# Patient Record
Sex: FEMALE
[De-identification: names, ages, dates, MRNs, and addresses within clinical notes are randomized per-mention and may not be internally consistent; named-entity substitution may affect disease eponyms.]

---

## 2022-02-24 ENCOUNTER — HOSPITAL ENCOUNTER (OUTPATIENT)
Dept: PHYSICAL THERAPY | Facility: CLINIC | Age: 27
Setting detail: THERAPIES SERIES
Discharge: HOME | End: 2022-02-24
Payer: COMMERCIAL

## 2022-02-24 PROCEDURE — 97110 THERAPEUTIC EXERCISES: CPT

## 2022-02-24 PROCEDURE — 97161 PT EVAL LOW COMPLEX 20 MIN: CPT

## 2022-02-24 NOTE — CONSULTS
José Miguel  [address]  Phone: [phone]  Fax: [phone]      Physical Therapy Lower Extremity Evaluation    Date:  2022  Patient: Adelso Sharif  : 1995  MRN: [id number]  Physician: DIANA Ellison   Insurance: Update  Medical Diagnosis: M22.2X1- Patellofemoral disorders, right knee  Rehab Codes: M25.561  Onset date: 2/10/22      Next 's appt. : --    Subjective:   CC/HPI: Pt reports to PT with R knee pain. Per pt, she reports that she had an overuse injury in her knee back in 2021 after going on a 5k run after taking some time off. She reports that she then took things easy which improved her symptoms. Pt then states that last week at work she re-aggravated it, stating that she had to go down into a ditch where she was slipping and sliding on ice. Pt states that she had a cortisone injection which did help, however she notes that she still has pain in her knee. Pt states that she is able to complete all work tasks, but extended periods in sitting or standing positions can lead to increased pain. Pt denies any numbness/tingling in her knee. Pt states that she has noticed her knee occasionally give out on her after being on her feet for longer periods. Reports occasional popping in her knee. Generally reports no pain with walking.      PMHx: [] Unremarkable [] Diabetes [] HTN  [] Pacemaker   [] MI/Heart Problems [] Cancer [] Arthritis   [] Other:              [x] Refer to full medical chart  In EPIC     Tests: [x] X-Ray: Negative per pt    [] MRI:    [] Other:     Comorbidities:   [] Obesity [] Dialysis  [x] N/A   [] Asthma/COPD [] Dementia [] Other:   [] Stroke [] Sleep apnea [] Other:   [] Vascular disease [] Rheumatic disease [] Other:       Medications:  [x] Refer to full medical record [] None [] Other:  Allergies:       [x] Refer to full medical record [] None [] Other:    ADL/IADL [x] Previously independent with all [x] Currently independent with all Who currently assists the patient with task     [] Previously independent with all except: [] Currently independent with all except:     Bathing  [] Assist [] Assist     Dress/grooming [] Assist [] Assist     Transfer/mobility [] Assist [] Assist     Feeding [] Assist [] Assist     Toileting [] Assist [] Assist     Driving [] Assist [] Assist     Housekeeping [] Assist [] Assist     Grocery shop/meal prep [] Assist [] Assist          Gait Prior level of function Current level of function    [x] Independent  [] Assist [x] Independent  [] Assist   Device: [x] Independent [x] Independent    [] Straight Cane [] Quad cane [] Straight Cane [] Quad cane    [] Standard walker [] Rolling walker   [] 4 wheeled walker [] Standard walker [] Rolling walker   [] 4 wheeled walker    [] Wheelchair [] Wheelchair       Marital Status    Home type Duplex   Stairs from outside    Stairs inside [id number] [facility] status Currently working,   [employer]   Work Activities/duties  Sitting   Recreational Activities Running, strength training, jiu-jitsu       Pain present? Yes   Location R knee   Pain Rating currently 1-2/10   Pain at worse 6/10   Pain at best 0/10   Description of pain Constant, dull, aching   Altered Sensation Denies   What makes it worse Positions, heat   What makes it better Cold   Symptom progression Gotten better   Sleep Sleeping not affected by knee             Objective:    STRENGTH    Left Right   Hip Flex 5/5 5/5   Ext 3+/5 3+/5   ABD 3+/5 3+/5   ADD     Knee Flex 5/5 4/5   Ext 5/5 4+/5   Ankle DF 5/5 5/5   PF 5/5 55/   INV     EVER                ROM  ° A/P    Left Right   Hip Flex     Ext     ER     IR     ABD     ADD     Knee Flex 146 145   Ext 2* from 0 1* hyperextension   Ankle DF     PF     INV     EVER              TESTS (+/-) Left Right Not Tested   Ant.  Drawer   [x]   Post. Drawer   [x]   Lachmans   [x] Valgus Stress   [x]   Varus Stress   [x]   Letys   [x]   Apleys Comp. [x]   Apleys Dist.   [x]   Hip Scouring   [x]   ROSA ELENAs   [x]   Piriformis   [x]   Armidas   [x]   Talor Tilt   [x]   Pat-Fem Saurabh Santos   [x]   Lanie Merannia  -        OBSERVATION No Deficit Deficit Not Tested Comments   Posture       Forward Head [] [] []    Rounded Shoulders [] [] []    Kyphosis [] [] []    Lordosis [] [] []    Lateral Shift [] [] []    Scoliosis [] [] []    Iliac Crest [x] [] []    PSIS [x] [] []    ASIS [] [] []    Genu Valgus [] [] []    Genu Varus [] [] []    Genu Recurvatum [] [] []    Pronation [] [] []    Supination [] [] []    Leg Length Discrp [x] [] []    Slumped Sitting [] [] []    Palpation [] [x] [] Pt reports tenderness along patellar and quad tendon attachment, tenderness along medial patella border   Sensation [x] [] [] No deficits with testing   Edema [] [] [x]    Neurological [] [] [x]    Patellar Mobility [x] [] []    Patellar Orientation [x] [] []    Gait [x] [] [] Analysis:         FUNCTION Normal Difficult Unable   Sitting [] [x] []   Standing [] [x] []   Ambulation [x] [] []   Groom/Dress [x] [] []   Lift/Carry [] [x] []   Stairs [x] [] []   Bending [x] [] []   Squat [] [x] []   Kneel [] [x] []         BALANCE/PROPRIOCEPTION              [] Not tested   Single leg stance                    R                       L                  PAIN   Eyes open                            30 Sec. 30 Sec              . []    Eyes closed                         10 Sec. 30 Sec              . []          FUNCTIONAL TESTS PAIN NO PAIN COMMENTS   Step Test 4 [] []    6 [] []    8 [] []    Squat [x] [] Pt with BW translation to R while completing, excessive anterior translation of knees over toes          Flexibility Normal Left tight Right tight   Hip flexor [] [x] [x]   quad [] [] []   HS [] [x] [x]   piriformis [x] [] []   gastroc [] [x] [x]    [] [] []    [] [] []    [] [] [] Functional Test: LEFS Score: 35% functionally impaired       Assessment:    Patient would benefit from skilled physical therapy services in order to: Progress enedelia hip/core strength, improve enedelia LE flexibility, improve technique with squatting, improve R LE proprioceptive control, and improve tolerance to working out recreationally. Problems:    [x] ? Pain  [] ? ROM  [x] ? Strength  [x] ? Function        Goals  MET NOT MET ON-  GOING  Details   Date Addressed: NA       STG: To be met in 8 treatments           1. ? Pain: Decrease pain levels to 3/10 with ADLs/working out recreationally. []  []  []      2. ? ROM: Increase flexibility in enedelia LEs to improve symmetry and help normalize mechanics with recreational activities and running. []  []  []      3. Pt will self-report better tolerance to all sitting and standing at work to ease work tasks. []  []  []     4. Independent with Home Exercise Programs []  []  []      []  []  []     Date Addressed: NA       LTG: To be met in 16 treatments       1. Improve score on assessment tool LEFS from 35% impairment to less than 20% impairment, demonstrating improved tolerance to working out recreationally. []  []  []     2. Reduce pain levels to 1/10 or less with ADLs/working out recreationally. []  []  []     3. ? Strength: Increase enedelia hip/core strength to 4+/5 grossly to help improve LE stability with running and working out recreationally. []  []  []     4. Pt will self-report ability to return to all running and working out without any pain to ease activity. Patient goals:  \"Be able to run and train w/o pain or issue\"    Rehab Potential:  [x] Good  [] Fair  [] Poor   Suggested Professional Referral:  [x] No  [] Yes:  Barriers to Goal Achievement[de-identified]  [x] No  [] Yes:  Domestic Concerns:  [x] No  [] Yes:    Pt. Education:  [x] Plans/Goals, Risks/Benefits discussed  [x] Home exercise program    Method of Education: [x] Verbal  [x] Demo  [x] Written  Access Code: [email or web address]  URL: [email or web address]  Date: 02/24/2022  Prepared by: [employer]    Exercises  Supine Bridge - 1 x daily - 7 x weekly - 3 sets - 10 reps - 5 seconds hold  Clamshell - 1 x daily - 7 x weekly - 3 sets - 10 reps  Sidelying Hip Abduction - 1 x daily - 7 x weekly - 3 sets - 10 reps  Prone Hip Extension with Bent Knee - 1 x daily - 7 x weekly - 3 sets - 10 reps    Comprehension of Education:  [x] Verbalizes understanding. [x] Demonstrates understanding. [x] Needs Review. [] Demonstrates/verbalizes understanding of HEP/Ed previously given. Treatment Plan:  [x] Therapeutic Exercise    [] Aquatic Therapy   [x] Manual Therapy     [] Electrical Stimulation  [x] Instruction in HEP      [] Lumbar/Cervical Traction  [x] Neuromuscular Re-education [x] Cold/hotpack  [] Iontophoresis: 4 mg/mL  [x] Vasocompression (GameReady)                    Dexamethasone Sodium  [] Gait Training             Phosphate 40-80 mAmin         []  Medication allergies reviewed for use of    Dexamethasone Sodium Phosphate 4mg/ml     with iontophoresis treatments. Pt is not allergic.     Frequency:  2 x/week for 16 visits    Todays Treatment:  Precautions: General  Exercises:  Exercise  R Knee Reps/ Time Weight/ Level Comments   Bike            HS S      SB S      Hip flexor step S      Prone quad S                  Bridges 2x10, 5\"     Clamshells 2x15     SL hip abd 2x12     Prone hip ext                        Other:    Specific Instructions for next treatment: Continue tx per POC    Evaluation Complexity:  History (Personal factors, comorbidities) [] 0 [x] 1-2 [] 3+   Exam (limitations, restrictions) [x] 1-2 [] 3 [] 4+   Clinical presentation (progression) [] Stable [x] Evolving  [] Unstable   Decision Making [x] Low [] Moderate [] High    [x] Low Complexity [] Moderate Complexity [] High Complexity       Treatment Charges: Mins Units   [x] Evaluation       [x]  Low       []  Moderate       []  High 33 1   [] Modalities     [x]  Ther Exercise 14 1   []  Manual Therapy     []  Ther Activities     []  Aquatics     []  Vasocompression     []  Other       TOTAL TREATMENT TIME: 47    Time in: 1400    Time Out: 8700    Electronically signed by: Oswaldo Davis PT        Physician Signature:________________________________Date:__________________  By signing above or cosigning this note, I have reviewed this plan of care and certify a need for medically necessary rehabilitation services.      *PLEASE SIGN ABOVE AND FAX BACK ALL PAGES*

## 2022-02-28 ENCOUNTER — HOSPITAL ENCOUNTER (OUTPATIENT)
Dept: PHYSICAL THERAPY | Facility: CLINIC | Age: 27
Setting detail: THERAPIES SERIES
Discharge: HOME | End: 2022-02-28
Payer: COMMERCIAL

## 2022-02-28 PROCEDURE — 97110 THERAPEUTIC EXERCISES: CPT

## 2022-02-28 PROCEDURE — 97016 VASOPNEUMATIC DEVICE THERAPY: CPT

## 2022-02-28 NOTE — FLOWSHEET NOTE
[x] [address]   Outpatient Rehabilitation &  Therapy  [address] [place] [address]  P: [phone]  F: [phone]     Physical Therapy Daily Treatment Note    Date:  2022  Patient Name:  Chuy Colbert    :  1995  MRN: [id number]  Physician: Sheryle Matters, CFNP                            Insurance: Update  Medical Diagnosis: M22.2X1- Patellofemoral disorders, right knee                       Rehab Codes: M25.561  Onset date: 2/10/22      Visit# / total visits:      Cancels/No Shows: 0    Subjective:    Pain:  [x] Yes  [] No Location: R knee  Pain Rating: (0-10 scale) /10  Pain altered Tx:  [x] No  [] Yes  Action:  Comments: Pt mentioned going down in another ditch over the weekend recreating her knee pain. Objective:  Precautions: General  Exercises:  Exercise  R Knee Reps/ Time Weight/ Level Comments    Bike 10m   Warm up  x              HS/calf S 30\"x3    x   Prone quad S 30\"x3                   Banded Bridges 2x10, 5\" Blueberry    x   Clamshells 20x Blueberry    x   SL hip abd 20x Blueberry    x   Prone hip ext 20x     x   SB Bridge 20x2\"  Legs straight x              SLR 20x     x   LAQ 20x3\"   x   SAQ 20x3\"     x   Other:     Specific Instructions for next treatment: Continue tx per POC        Treatment Charges: Mins Units Time     []  Modalities      [x]  Ther Exercise 40 3 2:00pm-2:40pm   []  Manual Therapy      []  Ther Activities      []  Aquatics      [x]  Vasocompression 15 1 2:42pm-2:57pm   []  Other      Total Treatment time 55 4        Assessment: [x] Progressing toward goals. [] No change. [x] Other:Initiated session with warm up followed with standing stretches. Added in both LAQ and SAQ to focus on quad activation. Added in resistance to table exercises with a blueberry band and focused on mechanics with proper mm activation. Finished session with vaso.      Goals  MET NOT MET ON-  GOING  Details   Date Addressed: NA           STG: To be met in 8 treatments  ?          1. ? Pain: Decrease pain levels to 3/10 with ADLs/working out recreationally. []??  []??  []??      2. ? ROM: Increase flexibility in enedelia LEs to improve symmetry and help normalize mechanics with recreational activities and running. []??  []??  []??      3. Pt will self-report better tolerance to all sitting and standing at work to ease work tasks. []??  []??  []??      4. Independent with Home Exercise Programs []? ?  []??  []??        []? ?  []??  []??      Date Addressed: NA           LTG: To be met in 16 treatments           1. Improve score on assessment tool LEFS from 35% impairment to less than 20% impairment, demonstrating improved tolerance to working out recreationally. []??  []??  []??      2. Reduce pain levels to 1/10 or less with ADLs/working out recreationally. []??  []??  []??      3. ? Strength: Increase enedelia hip/core strength to 4+/5 grossly to help improve LE stability with running and working out recreationally. []??  []??  []??      4. Pt will self-report ability to return to all running and working out without any pain to ease activity.                  Patient goals: \"Be able to run and train w/o pain or issue\"        Pt. Education:  [x] Yes  [] No  [x] Reviewed Prior HEP/Ed  Method of Education: [x] Verbal  [x] Demo  [] Written  Comprehension of Education:  [x] Verbalizes understanding. [x] Demonstrates understanding. [] Needs review. [] Demonstrates/verbalizes HEP/Ed previously given. Plan: [x] Continue with current plan of care towards goals.         Time In:2:00pm            Time Out: 2:57pm     Electronically signed by:  Meera Parker PTA

## 2022-03-03 ENCOUNTER — HOSPITAL ENCOUNTER (OUTPATIENT)
Dept: PHYSICAL THERAPY | Facility: CLINIC | Age: 27
Setting detail: THERAPIES SERIES
Discharge: HOME | End: 2022-03-03
Payer: COMMERCIAL

## 2022-03-03 PROCEDURE — 97016 VASOPNEUMATIC DEVICE THERAPY: CPT

## 2022-03-03 PROCEDURE — 97110 THERAPEUTIC EXERCISES: CPT

## 2022-03-03 NOTE — FLOWSHEET NOTE
[x] [address]   Outpatient Rehabilitation &  Therapy  [address] [facility]  P: [phone]  F: [phone]     Physical Therapy Daily Treatment Note    Date:  3/3/2022  Patient Name:  Krissy Colmenares    :  1995  MRN: [id number]  Physician: DIANA Fitch                            Insurance: Update  Medical Diagnosis: M22.2X1- Patellofemoral disorders, right knee                       Rehab Codes: M25.561  Onset date: 2/10/22      Visit# / total visits: 3/16     Cancels/No Shows: 0    Subjective:    Pain:  [x] Yes  [] No Location: R knee  Pain Rating: (0-10 scale) 2/10  Pain altered Tx:  [x] No  [] Yes  Action:  Comments: Pt stated that her knee is feeling better today. Objective:  Precautions: General  Exercises:  Exercise  R Knee Reps/ Time Weight/ Level Comments    Bike 10m Seat 2 Warm up  x              HS/calf S 30\"x3    x   Prone quad S 30\"x3     x              Banded Bridges 2x10, 5\" Blueberry    x   Clamshells 20x Blueberry    x   SL hip abd 20x Blueberry    x   Prone hip ext 20x   Bent knee x   SB Bridge 20x2\"  Legs straight x   SB Bridge with SLR 20x     x           LAQ 20x3\" 2#  x   4-way hip  10x Blueberry   x   Other:     Specific Instructions for next treatment: Continue tx per POC        Treatment Charges: Mins Units Time     []  Modalities      [x]  Ther Exercise 40 3 2:01pm- 2:41pm   []  Manual Therapy      []  Ther Activities      []  Aquatics      [x]  Vasocompression 15 1 2:42pm- 2:57pm   []  Other      Total Treatment time 55 4        Assessment: [x] Progressing toward goals. [] No change. [x] Other:  Continued with warm up and stretches to help increase blood flow and loosen up tight structures. Focused core/hip stability with the addition of bridges with SLR on SB to place pt on unstable surface while maintaining neutral position of hips. Standing 4 way hips added in to challenge pt SLS with added in force.  Pt did not have any increased symptoms just fatigued at end. Goals  MET NOT MET ON-  GOING  Details   Date Addressed: NA           STG: To be met in 8 treatments  ?          1. ? Pain: Decrease pain levels to 3/10 with ADLs/working out recreationally. []??  []??  []??      2. ? ROM: Increase flexibility in enedelia LEs to improve symmetry and help normalize mechanics with recreational activities and running. []??  []??  []??      3. Pt will self-report better tolerance to all sitting and standing at work to ease work tasks. []??  []??  []??      4. Independent with Home Exercise Programs []? ?  []??  []??        []? ?  []??  []??      Date Addressed: NA           LTG: To be met in 16 treatments           1. Improve score on assessment tool LEFS from 35% impairment to less than 20% impairment, demonstrating improved tolerance to working out recreationally. []??  []??  []??      2. Reduce pain levels to 1/10 or less with ADLs/working out recreationally. []??  []??  []??      3. ? Strength: Increase enedelia hip/core strength to 4+/5 grossly to help improve LE stability with running and working out recreationally. []??  []??  []??      4. Pt will self-report ability to return to all running and working out without any pain to ease activity.                  Patient goals: \"Be able to run and train w/o pain or issue\"        Pt. Education:  [x] Yes  [] No  [x] Reviewed Prior HEP/Ed  Method of Education: [x] Verbal  [x] Demo  [] Written  Comprehension of Education:  [x] Verbalizes understanding. [x] Demonstrates understanding. [] Needs review. [] Demonstrates/verbalizes HEP/Ed previously given. Plan: [x] Continue with current plan of care towards goals.         Time In:2:01pm            Time Out:  2:57pm    Electronically signed by:  Eyal Baxter PTA

## 2022-03-07 ENCOUNTER — HOSPITAL ENCOUNTER (OUTPATIENT)
Dept: PHYSICAL THERAPY | Facility: CLINIC | Age: 27
Setting detail: THERAPIES SERIES
Discharge: HOME | End: 2022-03-07
Payer: COMMERCIAL

## 2022-03-07 PROCEDURE — 97110 THERAPEUTIC EXERCISES: CPT

## 2022-03-07 NOTE — FLOWSHEET NOTE
Abduction Kicks - 1 x daily - 4 x weekly - 3 sets - 10 reps  Standing Hip Extension Kicks - 1 x daily - 4 x weekly - 3 sets - 10 reps  Gastroc Stretch on Wall - 1 x daily - 7 x weekly - 3 sets - 30 hold  Standing Hamstring Stretch with Step - 1 x daily - 7 x weekly - 3 sets - 30 hold  Standing Quadriceps Stretch - 1 x daily - 7 x weekly - 3 sets - 30 hold      Treatment Charges: Mins Units Time     []  Modalities      [x]  Ther Exercise 59 4 3:00pm- 3:59pm   []  Manual Therapy      []  Ther Activities      []  Aquatics      []  Vasocompression       []  Other      Total Treatment time 59 4 3:00pm-3:59pm       Assessment: [x] Progressing toward goals. [] No change. [x] Other:   Increased strengthening with active quad targeting exercises. Pt able to perform all seated and table exercises with min verbal cueing for slow and controlled movements d/t pt loosing form with fatigue. Pt unable to hold split stance squat isometric for extended period of time d/t weakness/fatigue along with R knee pain when in retro position. Pt was given written HEP along with blueberry band for home. Goals  MET NOT MET ON-  GOING  Details   Date Addressed: NA           STG: To be met in 8 treatments  ?          1. ? Pain: Decrease pain levels to 3/10 with ADLs/working out recreationally. []??  []??  []??      2. ? ROM: Increase flexibility in enedelia LEs to improve symmetry and help normalize mechanics with recreational activities and running. []??  []??  []??      3. Pt will self-report better tolerance to all sitting and standing at work to ease work tasks. []??  []??  []??      4. Independent with Home Exercise Programs []? ?  []??  []??        []? ?  []??  []??      Date Addressed: NA           LTG: To be met in 16 treatments           1. Improve score on assessment tool LEFS from 35% impairment to less than 20% impairment, demonstrating improved tolerance to working out recreationally. []??  []??  []??      2.  Reduce pain levels to 1/10 or less with ADLs/working out recreationally. []??  []??  []??      3. ? Strength: Increase enedelia hip/core strength to 4+/5 grossly to help improve LE stability with running and working out recreationally. []??  []??  []??      4. Pt will self-report ability to return to all running and working out without any pain to ease activity.                  Patient goals: \"Be able to run and train w/o pain or issue\"        Pt. Education:  [x] Yes  [] No  [x] Reviewed Prior HEP/Ed  Method of Education: [x] Verbal  [x] Demo  [] Written  Comprehension of Education:  [x] Verbalizes understanding. [x] Demonstrates understanding. [] Needs review. [] Demonstrates/verbalizes HEP/Ed previously given. Plan: [x] Continue with current plan of care towards goals.         Time In:3:00pm            Time Out:  3:59pm    Electronically signed by:  Elisa Ott PTA

## 2022-03-08 ENCOUNTER — APPOINTMENT (OUTPATIENT)
Dept: PHYSICAL THERAPY | Facility: CLINIC | Age: 27
End: 2022-03-08
Payer: COMMERCIAL

## 2022-03-10 ENCOUNTER — HOSPITAL ENCOUNTER (OUTPATIENT)
Dept: PHYSICAL THERAPY | Facility: CLINIC | Age: 27
Setting detail: THERAPIES SERIES
Discharge: HOME | End: 2022-03-10
Payer: COMMERCIAL

## 2022-03-10 PROCEDURE — 97110 THERAPEUTIC EXERCISES: CPT

## 2022-03-10 NOTE — FLOWSHEET NOTE
[x] [address]    Outpatient Rehabilitation &  Therapy  [address] [place] [address]  P: [phone]  F: [phone]     Physical Therapy Daily Treatment Note    Date:  3/10/2022  Patient Name:  Yuli Jose    :  1995  MRN: [id number]  Physician: DIANA Sanchez                            Insurance: Update  Medical Diagnosis: M22.2X1- Patellofemoral disorders, right knee                       Rehab Codes: M25.561  Onset date: 2/10/22      Visit# / total visits:      Cancels/No Shows: 0    Subjective:    Pain:  [x] Yes  [] No Location: R knee  Pain Rating: (0-10 scale) 2/10  Pain altered Tx:  [x] No  [] Yes  Action:  Comments: Pt stated that she had a little pain this morning with twisting motions but nothing too bad. Objective:  Precautions: General  Exercises:  Exercise  R Knee Reps/ Time Weight/ Level Comments    Bike 10m Seat 2 Warm up  x              HS/calf S 30\"x3    x   Prone quad S 30\"x3     x              Banded Bridges 2x10, 5\" Blueberry    HEP   Clamshells 20x Blueberry    HEP   SL hip abd 20x Blueberry    HEP   SB Bridge 20x2\" Blue SB Legs straight x   SB Bridge with HS curl 20x     x          Heel taps 2x10 6\" Lateral  x   Eccentric lowering 20x Bilateral  24\" x   Power strides 20x Bilateral  12\" x   Box squats 20x Blueberry  12\" with blue foam pad x   Box squat hovers 20x3\" Blueberry  12\" with blue foam pad x   SL dead lift touches 2x10 Bilateral  6\" x                  LAQ 20x5\" 4#  x   SAQ 20x5\" 4#  x   SLR 20x 4#  x   4-way hip  15x Blueberry   HEP                 Other:     Specific Instructions for next treatment: Continue tx per POC     Access Code: [email or web address]  URL: [email or web address]  Date: 03/10/2022  Prepared by: Mariano Moser    Exercises  Gastroc Stretch on Wall - 1 x daily - 7 x weekly - 3 sets - 30 hold  Standing Hamstring Stretch with Step - 1 x daily - 7 x weekly - 3 sets - 30 hold  Standing Quadriceps Stretch - 1 x daily - 7 x weekly - 3 sets - 30 hold  Clam with Resistance - 1 x daily - 4 x weekly - 3 sets - 10 reps  Supine Bridge with Resistance Band - 1 x daily - 4 x weekly - 3 sets - 10 reps  Sidelying Hip Abduction with Resistance at Ankle - 1 x daily - 4 x weekly - 3 sets - 10 reps  Hip Extension with Resistance Loop - 1 x daily - 4 x weekly - 3 sets - 10 reps  Hip Abduction with Resistance Loop - 1 x daily - 4 x weekly - 3 sets - 10 reps  Standing Hip Flexion with Resistance Loop - 1 x daily - 4 x weekly - 3 sets - 10 reps      Treatment Charges: Mins Units Time     []  Modalities      [x]  Ther Exercise 60 4 3:00pm- 4:00pm   []  Manual Therapy      []  Ther Activities      []  Aquatics      []  Vasocompression       []  Other      Total Treatment time 60 4 3:00pm-4:00pm       Assessment: [x] Progressing toward goals. [] No change. [x] Other:   Progressed all standing exercises with focus on single leg strength and stability exercises with focus on isolation of quad with eccentric movements. Able to progress load when focusing on posterior chain with all movements concentrated on slow and controlled movements. Attempted low squat hover but pt did experience increased symptoms in knee so discontinued exercise. Finished on quad strengthening on mat table. Goals  MET NOT MET ON-  GOING  Details   Date Addressed: NA           STG: To be met in 8 treatments  ?          1. ? Pain: Decrease pain levels to 3/10 with ADLs/working out recreationally. []??  []??  []??      2. ? ROM: Increase flexibility in enedelia LEs to improve symmetry and help normalize mechanics with recreational activities and running. []??  []??  []??      3. Pt will self-report better tolerance to all sitting and standing at work to ease work tasks. []??  []??  []??      4. Independent with Home Exercise Programs []? ?  []??  []??        []? ?  []??  []??      Date Addressed: NA           LTG: To be met in 16 treatments           1.  Improve score on assessment tool LEFS from 35% impairment to less than 20% impairment, demonstrating improved tolerance to working out recreationally. []??  []??  []??      2. Reduce pain levels to 1/10 or less with ADLs/working out recreationally. []??  []??  []??      3. ? Strength: Increase enedelia hip/core strength to 4+/5 grossly to help improve LE stability with running and working out recreationally. []??  []??  []??      4. Pt will self-report ability to return to all running and working out without any pain to ease activity.                  Patient goals: \"Be able to run and train w/o pain or issue\"        Pt. Education:  [x] Yes  [] No  [x] Reviewed Prior HEP/Ed  Method of Education: [x] Verbal  [x] Demo  [] Written  Comprehension of Education:  [x] Verbalizes understanding. [x] Demonstrates understanding. [] Needs review. [] Demonstrates/verbalizes HEP/Ed previously given. Plan: [x] Continue with current plan of care towards goals.         Time In:3:00pm            Time Out:  4:00pm    Electronically signed by:  Ella Beck PTA

## 2022-03-14 ENCOUNTER — HOSPITAL ENCOUNTER (OUTPATIENT)
Dept: PHYSICAL THERAPY | Facility: CLINIC | Age: 27
Setting detail: THERAPIES SERIES
Discharge: HOME | End: 2022-03-14
Payer: COMMERCIAL

## 2022-03-14 PROCEDURE — 97110 THERAPEUTIC EXERCISES: CPT

## 2022-03-14 NOTE — FLOWSHEET NOTE
[x] [address]   Outpatient Rehabilitation &  Therapy  [address] [facility]  P: [phone]  F: [phone]     Physical Therapy Daily Treatment Note    Date:  3/14/2022  Patient Name:  Latisha Cabello    :  1995  MRN: [id number]  Physician: DIANA Bloom                            Insurance: Update  Medical Diagnosis: M22.2X1- Patellofemoral disorders, right knee                       Rehab Codes: M25.561  Onset date: 2/10/22      Visit# / total visits:      Cancels/No Shows: 0    Subjective:    Pain:  [x] Yes  [] No Location: R knee  Pain Rating: (0-10 scale) /10  Pain altered Tx:  [x] No  [] Yes  Action:  Comments: Pt mentioned a little pain at bottom of knee cap but she thinks that it is from working all weekend. Objective:  Precautions: General  Exercises:  Exercise  R Knee Reps/ Time Weight/ Level Comments    Bike 10m Seat 2 Warm up  x              HS/calf S 30\"x3    x   Prone quad S 30\"x3     x              SB Bridge with HS curl 20x Blue SB   x   Monster walks 2L Green tube  Only able to complete 1 lap d/t LB stiffness x   Heel taps 2x10 6\" Lateral  x   Eccentric lowering 20x Bilateral  24\" x   Power strides 20x Bilateral  12\" x   Box squats 20x Blueberry  12\" with blue foam pad x   Box squat hovers 20x3\" Blueberry  12\" with blue foam pad x   SL dead lift touches 2x10 Bilateral  6\" x                  LAQ 20x5\" 4#  x   SAQ 20x5\" 4#  x   SLR 20x 4#  x          Other:     Specific Instructions for next treatment: Continue tx per POC     Access Code: [email or web address]  URL: [email or web address]  Date: 03/10/2022  Prepared by: To Quezada    Exercises  Gastroc Stretch on Wall - 1 x daily - 7 x weekly - 3 sets - 30 hold  Standing Hamstring Stretch with Step - 1 x daily - 7 x weekly - 3 sets - 30 hold  Standing Quadriceps Stretch - 1 x daily - 7 x weekly - 3 sets - 30 hold  Clam with Resistance - 1 x daily - 4 x weekly - 3 sets - 10 reps  Supine Bridge with Resistance Band - 1 x daily - 4 x weekly - 3 sets - 10 reps  Sidelying Hip Abduction with Resistance at Ankle - 1 x daily - 4 x weekly - 3 sets - 10 reps  Hip Extension with Resistance Loop - 1 x daily - 4 x weekly - 3 sets - 10 reps  Hip Abduction with Resistance Loop - 1 x daily - 4 x weekly - 3 sets - 10 reps  Standing Hip Flexion with Resistance Loop - 1 x daily - 4 x weekly - 3 sets - 10 reps      Treatment Charges: Mins Units Time     []  Modalities      [x]  Ther Exercise 58 4 3:02pm- 4:00pm   []  Manual Therapy      []  Ther Activities      []  Aquatics      []  Vasocompression       []  Other      Total Treatment time 58 4 3:02pm-4:00pm       Assessment: [x] Progressing toward goals. [] No change. [x] Other:   No further progressions today. Focused on controlled movements and mechanics. Pt has min/mod valgus collapse on R knee with eccentric lowering but able correct with cueing. Attempted lateral monster walks with green tube but was having symptoms with LB so discontinued. Goals  MET NOT MET ON-  GOING  Details   Date Addressed: NA           STG: To be met in 8 treatments  ?          1. ? Pain: Decrease pain levels to 3/10 with ADLs/working out recreationally. []??  []??  []??      2. ? ROM: Increase flexibility in enedelia LEs to improve symmetry and help normalize mechanics with recreational activities and running. []??  []??  []??      3. Pt will self-report better tolerance to all sitting and standing at work to ease work tasks. []??  []??  []??      4. Independent with Home Exercise Programs []? ?  []??  []??        []? ?  []??  []??      Date Addressed: NA           LTG: To be met in 16 treatments           1. Improve score on assessment tool LEFS from 35% impairment to less than 20% impairment, demonstrating improved tolerance to working out recreationally. []??  []??  []??      2. Reduce pain levels to 1/10 or less with ADLs/working out recreationally. []??  []??  []??      3. ? Strength: Increase enedelia hip/core strength to 4+/5 grossly to help improve LE stability with running and working out recreationally. []??  []??  []??      4. Pt will self-report ability to return to all running and working out without any pain to ease activity.                  Patient goals: \"Be able to run and train w/o pain or issue\"        Pt. Education:  [x] Yes  [] No  [x] Reviewed Prior HEP/Ed  Method of Education: [x] Verbal  [x] Demo  [] Written  Comprehension of Education:  [x] Verbalizes understanding. [x] Demonstrates understanding. [] Needs review. [] Demonstrates/verbalizes HEP/Ed previously given. Plan: [x] Continue with current plan of care towards goals.         Time In:3:02pm            Time Out:  4:00pm    Electronically signed by:  Dany Koroma PTA

## 2022-03-16 ENCOUNTER — HOSPITAL ENCOUNTER (OUTPATIENT)
Dept: PHYSICAL THERAPY | Facility: CLINIC | Age: 27
Setting detail: THERAPIES SERIES
Discharge: HOME | End: 2022-03-16
Payer: COMMERCIAL

## 2022-03-16 PROCEDURE — 97110 THERAPEUTIC EXERCISES: CPT

## 2022-03-16 PROCEDURE — 97140 MANUAL THERAPY 1/> REGIONS: CPT

## 2022-03-16 NOTE — FLOWSHEET NOTE
[x] [address]   Outpatient Rehabilitation &  Therapy  [address] [place] [address]  P: [phone]  F: [phone]     Physical Therapy Daily Treatment Note    Date:  3/16/2022  Patient Name:  Jessy Uribe    :  1995  MRN: [id number]  Physician: DIANA Moore                            Insurance: Update  Medical Diagnosis: M22.2X1- Patellofemoral disorders, right knee                       Rehab Codes: M25.561  Onset date: 2/10/22      Visit# / total visits:      Cancels/No Shows: 0/0    Subjective:  Pt reporting to PT with 1/10 pain, stating that she is pretty sore from her previous treatment still. Pain:  [x] Yes  [] No  Location: R knee   Pain Rating: (0-10 scale) 1/10  Pain altered Tx:  [x] No  [] Yes  Action:  Comments:        Objective:  Precautions: General  Exercises:  Exercise  R Knee Reps/ Time Weight/ Level Comments    Bike 10m Seat 2 Warm up  x              HS/calf S 30\"x3    x   Prone quad S 30\"x3   Half foam roller under thigh x              SB Bridge with HS curl 20x Blue SB      Monster walks 3L Green tube   x   Heel taps 2x10 6\" Lateral  x   Eccentric lowering 20x Bilateral  24\"    Power strides 20x Bilateral  12\"    Box squats 20x Blueberry  12\" with blue foam pad    Box squat hovers 20x3\" Blueberry  12\" with blue foam pad    SL dead lift touches 2x10 Bilateral  6\"    Heel supported squats 3x10 6# db  x   Split squat TKE 2x10 Red band  x           LAQ 2x12, 5\" 4#  x   SAQ 20x5\" 4#     SLR 2x12 4#  x          Other: DI R piriformis, glut med     Specific Instructions for next treatment: Continue tx per POC        Treatment Charges: Mins Units Time     []  Modalities      [x]  Ther Exercise 50 7 [id number]   [x]  Manual Therapy 7 2 [id number]   []  Ther Activities      []  Aquatics      []  Vasocompression       []  Other      Total Treatment time 57 4        Assessment: [x] Progressing toward goals.  Completed tx per exercise log above without any complaints. Progressed pt today with increased reps with all ankle weight exercises, increased reps with monster walks, as well as with addition of split squat TKE and heel supported squats to help with progressing quad strength. Pt reports better tolerance to monster walks today, denying any pain with completion, allowing for performance of all laps. Pt is also reporting fatigue with split squat TKE, however denied any pain with completion. Ended with manual per log above with tenderness throughout, but good releases noted. [] No change. [x] Other:   No further progressions today. Focused on controlled movements and mechanics. Pt has min/mod valgus collapse on R knee with eccentric lowering but able correct with cueing. Attempted lateral monster walks with green tube but was having symptoms with LB so discontinued. Goals  MET NOT MET ON-  GOING  Details   Date Addressed: NA           STG: To be met in 8 treatments  ?          1. ? Pain: Decrease pain levels to 3/10 with ADLs/working out recreationally. []??  []??  []??      2. ? ROM: Increase flexibility in enedelia LEs to improve symmetry and help normalize mechanics with recreational activities and running. []??  []??  []??      3. Pt will self-report better tolerance to all sitting and standing at work to ease work tasks. []??  []??  []??      4. Independent with Home Exercise Programs []? ?  []??  []??        []? ?  []??  []??      Date Addressed: NA           LTG: To be met in 16 treatments           1. Improve score on assessment tool LEFS from 35% impairment to less than 20% impairment, demonstrating improved tolerance to working out recreationally. []??  []??  []??      2. Reduce pain levels to 1/10 or less with ADLs/working out recreationally. []??  []??  []??      3. ? Strength: Increase enedelia hip/core strength to 4+/5 grossly to help improve LE stability with running and working out recreationally. []??  []??  []??      4.  Pt will self-report ability to return to all running and working out without any pain to ease activity.                  Patient goals: \"Be able to run and train w/o pain or issue\"        Pt. Education:  [x] Yes  [] No  [x] Reviewed Prior HEP/Ed  Method of Education: [x] Verbal  [x] Demo  [] Written  Comprehension of Education:  [x] Verbalizes understanding. [x] Demonstrates understanding. [x] Needs review. [] Demonstrates/verbalizes HEP/Ed previously given. Plan: [x] Continue with current plan of care towards goals. Time In: 0800           Time Out: 0900     Electronically signed by:   Chau Arevalo, PT

## 2022-03-22 ENCOUNTER — HOSPITAL ENCOUNTER (OUTPATIENT)
Dept: PHYSICAL THERAPY | Facility: CLINIC | Age: 27
Setting detail: THERAPIES SERIES
Discharge: HOME | End: 2022-03-22
Payer: COMMERCIAL

## 2022-03-22 PROCEDURE — 97110 THERAPEUTIC EXERCISES: CPT

## 2022-03-22 NOTE — FLOWSHEET NOTE
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 Pain:  [x] Yes  [] No  Location: R knee   Pain Rating: (0-10 scale) 0/10  Pain altered Tx:  [x] No  [] Yes  Action:  Comments:        Objective:   Precautions: General  Exercises:  Exercise  R Knee Reps/ Time Weight/ Level Comments    Bike 10m Seat 2 Warm up  x              HS/calf S 30\"x3    x   Prone quad S 30\"x3   Half foam roller under thigh x              SB Bridge with HS curl 20x Blue SB   x   Monster walks 3L Green tube      Heel taps 2x10 8\" Lateral  x   Eccentric lowering 20x Bilateral  24\"    Power strides 20x Bilateral  12\" x   Box squats 20x Blueberry  12\" with blue foam pad, blue band around knees x   Box squat hovers 20x3\" Blueberry  12\" with blue foam pad x   SL dead lift touches 2x10 9# KB 6\" x   Heel supported squats 3x10 7# db  x   Split squat TKE 2x10 Red band             LAQ 2x12, 5\" 4#  x   SAQ 20x5\" 4#     SLR 2x12 4#  x          Other: DI R piriformis, glut med     Specific Instructions for next treatment: Continue tx per POC        Treatment Charges: Mins Units Time     []  Modalities      [x]  Ther Exercise 55 4 2:00 pm-2:55 pm   [x]  Manual Therapy      []  Ther Activities      []  Aquatics      []  Vasocompression       []  Other      Total Treatment time 55 4        Assessment: [x] Progressing toward goals.  Completed tx per exercise log above without any complaints. Progressed weight for SL deadlifts and heel elevated squats, inc box height for eccentric heel taps. Pt tolerated progression of exercises with good mechanics. Pt demonstrates fatigue with box squats, but no c/o pain or other discomfort. [] No change. [x] Other:   No further progressions today. Focused on controlled movements and mechanics. Pt has min/mod valgus collapse on R knee with eccentric lowering but able correct with cueing. Attempted lateral monster walks with green tube but was having symptoms with LB so discontinued. Goals  MET NOT MET ON-  GOING  Details   Date Addressed: NA           STG: To be met in 8 treatments  ?          1. ? Pain: Decrease pain levels to 3/10 with ADLs/working out recreationally. []??  []??  []??      2. ? ROM: Increase flexibility in enedelia LEs to improve symmetry and help normalize mechanics with recreational activities and running. []??  []??  []??      3. Pt will self-report better tolerance to all sitting and standing at work to ease work tasks. []??  []??  []??      4. Independent with Home Exercise Programs []? ?  []??  []??        []? ?  []??  []??      Date Addressed: NA           LTG: To be met in 16 treatments           1. Improve score on assessment tool LEFS from 35% impairment to less than 20% impairment, demonstrating improved tolerance to working out recreationally. []??  []??  []??      2. Reduce pain levels to 1/10 or less with ADLs/working out recreationally. []??  []??  []??      3. ? Strength: Increase enedelia hip/core strength to 4+/5 grossly to help improve LE stability with running and working out recreationally. []??  []??  []??      4. Pt will self-report ability to return to all running and working out without any pain to ease activity.                  Patient goals: \"Be able to run and train w/o pain or issue\"        Pt.  Education:  [x] Yes  [] No  [x] Reviewed Prior HEP/Ed  Method of Education: [x] Verbal [x] Demo  [] Written  Comprehension of Education:  [x] Verbalizes understanding. [x] Demonstrates understanding. [x] Needs review. [] Demonstrates/verbalizes HEP/Ed previously given. Plan: [x] Continue with current plan of care towards goals. Time In: 2:00 pm           Time Out: 2:55 pm    Electronically signed by:  CHEPE Marin The above documentation completed by Yuval Martinez, Student Physical Therapist Assistant, was reviewed and accepted by supervising Clinical Instructor Miley Rankin PTA.

## 2022-03-23 NOTE — FLOWSHEET NOTE
[] [facility] [address]  [address]    [phone]  F: [phone]   [] [address]  [facility] [address]   [address]  P: [phone]  F: [phone]  [x] [address] [facility] &  Therapy  [address]  P: [phone]  F: [phone] [] [address]  P: [phone]  F: [phone]  [] [address]  [address] [facility] [address]   [address]   [place]: [phone]  F: [phone]   [] [address]  [place]: [phone]   F: [phone]     Physical Therapy Cancel/No Show note    Date: 3/23/2022  Patient: Latisha Cabello  : 1995  MRN: [id number]    Cancels/No Shows to date:     For today's appointment patient:    [x]  Cancelled    [] Rescheduled appointment    [] No-show     Reason given by patient:    []  Patient ill    []  Conflicting appointment    [] No transportation      [] Conflict with work    [x] No reason given    [] Weather related    [] COVID-19    [] Other:      Comments:        [x] Next appointment was confirmed    Electronically signed by: Cassy Crisostomo, PT

## 2022-03-24 ENCOUNTER — HOSPITAL ENCOUNTER (OUTPATIENT)
Dept: PHYSICAL THERAPY | Facility: CLINIC | Age: 27
Setting detail: THERAPIES SERIES
Discharge: HOME | End: 2022-03-24
Payer: COMMERCIAL

## 2022-03-29 ENCOUNTER — HOSPITAL ENCOUNTER (OUTPATIENT)
Dept: PHYSICAL THERAPY | Facility: CLINIC | Age: 27
Setting detail: THERAPIES SERIES
Discharge: HOME | End: 2022-03-29
Payer: COMMERCIAL

## 2022-03-29 PROCEDURE — 97110 THERAPEUTIC EXERCISES: CPT

## 2022-03-29 NOTE — FLOWSHEET NOTE
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 Pain:  [x] Yes  [] No  Location: R knee   Pain Rating: (0-10 scale) 0/10  Pain altered Tx:  [x] No  [] Yes  Action:  Comments:        Objective:   Precautions: General  Exercises:  Exercise  R Knee Reps/ Time Weight/ Level Comments    Bike 10m Seat 2 Warm up  x              HS/calf S 30\"x3    x   Prone quad S 30\"x3   Half foam roller under thigh               SB Bridge with HS curl 20x Blue SB             Monster walks 3L Green tube      Heel taps 2x10 8\" Lateral  x   Eccentric lowering 20x Bilateral  24\" x   Power strides 20x Bilateral  12\" x   Box squats 20x Blueberry  12\" with blue foam pad, blue band around knees x   Box squat hovers 20x3\" Blueberry  12\" with blue foam pad x   SL dead lift touches 2x10 9# KB 6\" x   Heel supported squats 3x10 12# bar  x   Split squat TKE 2x10 Red band  x           LAQ 2x12, 5\" 4#     SAQ 20x5\" 4#     SLR 2x12 4#            Other:      Specific Instructions for next treatment: Continue tx per POC        Treatment Charges: Mins Units Time     []  Modalities      [x]  Ther Exercise 45 3 3:15 pm-4:00 pm   [x]  Manual Therapy      []  Ther Activities      []  Aquatics      []  Vasocompression       []  Other      Total Treatment time 45 3        Assessment: [x] Progressing toward goals.  Completed tx per exercise log above without any complaints. Progressed weight for heel elevated squats. Pt tolerated progression of exercises with good mechanics. Pt demonstrates inc control of eccentric movements and dec valgus collapse during SL activities. Pt experiences fatigue with exercise, but no c/o pain. [] No change. [x] Other:   No further progressions today. Focused on controlled movements and mechanics. Attempted lateral monster walks with green tube but was having symptoms with LB so discontinued. Goals  MET NOT MET ON-  GOING  Details   Date Addressed: NA           STG: To be met in 8 treatments  ?          1. ? Pain: Decrease pain levels to 3/10 with ADLs/working out recreationally. []??  []??  []??      2. ? ROM: Increase flexibility in enedelia LEs to improve symmetry and help normalize mechanics with recreational activities and running. []??  []??  []??      3. Pt will self-report better tolerance to all sitting and standing at work to ease work tasks. []??  []??  []??      4. Independent with Home Exercise Programs []? ?  []??  []??        []? ?  []??  []??      Date Addressed: NA           LTG: To be met in 16 treatments           1. Improve score on assessment tool LEFS from 35% impairment to less than 20% impairment, demonstrating improved tolerance to working out recreationally. []??  []??  []??      2. Reduce pain levels to 1/10 or less with ADLs/working out recreationally. []??  []??  []??      3. ? Strength: Increase enedelia hip/core strength to 4+/5 grossly to help improve LE stability with running and working out recreationally. []??  []??  []??      4. Pt will self-report ability to return to all running and working out without any pain to ease activity.                  Patient goals: \"Be able to run and train w/o pain or issue\"        Pt. Education:  [x] Yes  [] No  [x] Reviewed Prior HEP/Ed  Method of Education: [x] Verbal  [x] Demo  [] Written  Comprehension of Education:  [x] Verbalizes understanding.   [x] Demonstrates understanding. [x] Needs review. [] Demonstrates/verbalizes HEP/Ed previously given. Plan: [x] Continue with current plan of care towards goals. Time In: 3:15 pm           Time Out: 4:00 pm    Electronically signed by:  CHEPE Segundo The above documentation completed by Héctor Lewis, Student Physical Therapist Assistant, was reviewed and accepted by supervising Clinical Instructor Stephania Ogden PTA.

## 2022-04-06 ENCOUNTER — HOSPITAL ENCOUNTER (OUTPATIENT)
Dept: PHYSICAL THERAPY | Facility: CLINIC | Age: 27
Setting detail: THERAPIES SERIES
Discharge: HOME | End: 2022-04-06
Payer: COMMERCIAL

## 2022-04-06 PROCEDURE — 97110 THERAPEUTIC EXERCISES: CPT

## 2022-04-06 NOTE — FLOWSHEET NOTE
[x] [address]   Outpatient Rehabilitation &  Therapy  [address] [facility]  P: [phone]  F: [phone]     Physical Therapy Daily Treatment Note    Date:  2022  Patient Name:  Rhianna Camarillo    :  1995  MRN: [id number]  Physician: DIANA Daniel                            Insurance: Update  Medical Diagnosis: M22.2X1- Patellofemoral disorders, right knee                       Rehab Codes: M25.561  Onset date: 2/10/22      Visit# / total visits: 10/16     Cancels/No Shows: 1/0    Subjective:  Despite having to run and walk down ditches yesterday, pt states that she not experiencing any pain. She did experience a short period of pain early this morning.   Pain:  [] Yes  [x] No  Location: R knee   Pain Rating: (0-10 scale) 0/10  Pain altered Tx:  [x] No  [] Yes  Action:  Comments:        Objective:   Precautions: General  Exercises:  Exercise  R Knee Reps/ Time Weight/ Level Comments    Bike 10m Seat 2 Warm up  x              HS/calf S 30\"x3    x   Prone quad S 30\"x3   Half foam roller under thigh               SB Bridge with HS curl 20x Blue SB             Monster walks 3L Green tube      Heel taps 3x10 8\" Lateral  x   Eccentric lowering 30x Bilateral  24\" x   Power strides 30x Bilateral  12\" x   Box squats 30x Blueberry  12\" with blue foam pad, blue band around knees x   Box squat hovers 30x3\" Blueberry  12\" with blue foam pad x   SL dead lift touches 3x10 13# KB 6\" x   Heel supported squats 3x10 12# bar     Split squat TKE 3x10 Red band     BOSU lunges 2x10   x           LAQ 2x12, 5\" 4#     SAQ 20x5\" 4#     SLR 2x12 4#            Other:      Specific Instructions for next treatment: Continue tx per POC        Treatment Charges: Mins Units Time     []  Modalities      [x]  Ther Exercise 55 4 12:05 pm- 1:00 pm   [x]  Manual Therapy      []  Ther Activities      []  Aquatics      []  Vasocompression       []  Other      Total Treatment time 55 4 Assessment: [x] Progressing toward goals. Completed tx per exercise log above without any complaints. Initiated BOSU lunges to strengthen and challenge the stability of the R knee. Inc repetitions for all standing exercises to further improve strength and endurance. Pt continues to demo inc control of movements as well as dec valgus collapse. Pt tolerated exercise progressions well, needs occasional rest breaks due to fatigue but no c/o pain. [] No change. [x] Other:   No further progressions today. Focused on controlled movements and mechanics. Attempted lateral monster walks with green tube but was having symptoms with LB so discontinued. Goals  MET NOT MET ON-  GOING  Details   Date Addressed: NA           STG: To be met in 8 treatments            1. ? Pain: Decrease pain levels to 3/10 with ADLs/working out recreationally. []  []  []      2. ? ROM: Increase flexibility in enedelia LEs to improve symmetry and help normalize mechanics with recreational activities and running. []  []  []      3. Pt will self-report better tolerance to all sitting and standing at work to ease work tasks. []  []  []      4. Independent with Home Exercise Programs []  []  []        []  []  []      Date Addressed: NA           LTG: To be met in 16 treatments           1. Improve score on assessment tool LEFS from 35% impairment to less than 20% impairment, demonstrating improved tolerance to working out recreationally. []  []  []      2. Reduce pain levels to 1/10 or less with ADLs/working out recreationally. []  []  []      3. ? Strength: Increase enedelia hip/core strength to 4+/5 grossly to help improve LE stability with running and working out recreationally. []  []  []      4. Pt will self-report ability to return to all running and working out without any pain to ease activity. Patient goals: \"Be able to run and train w/o pain or issue\"        Pt.  Education:  [x] Yes  [] No  [x] Reviewed Prior HEP/Ed  Method of Education: [x] Verbal  [x] Demo  [] Written  Comprehension of Education:  [x] Verbalizes understanding. [x] Demonstrates understanding. [x] Needs review. [] Demonstrates/verbalizes HEP/Ed previously given. Plan: [x] Continue with current plan of care towards goals. Time In: 12:05 pm           Time Out: 1:00 pm    Electronically signed by:  Reyes Pagan, SPTA The above documentation completed by Reyes Pagan, Student Physical Therapist Assistant, was reviewed and accepted by supervising Clinical Instructor Janette Wlaton PTA.

## 2022-04-12 ENCOUNTER — HOSPITAL ENCOUNTER (OUTPATIENT)
Dept: PHYSICAL THERAPY | Facility: CLINIC | Age: 27
Setting detail: THERAPIES SERIES
Discharge: HOME | End: 2022-04-12
Payer: COMMERCIAL

## 2022-04-12 PROCEDURE — 97110 THERAPEUTIC EXERCISES: CPT

## 2022-04-12 NOTE — FLOWSHEET NOTE
Assessment: [x] Progressing toward goals. Began with spin bike followed by stretching and strengthening as noted above. Initiated PMT double and SL PMT squat/HR, pt tolerated additional exercises well, requiring verbal cueing for foot placement. Also began use of rebounder and progressed to forward heel taps with good pt tolerance. Attempted to add BOSU split squats, but pt began experiencing inc sxs. Pt also experienced inc sxs when performing eccentric lowering, both exercises discontinued. Pt requires cueing during heel elevated squats to slow eccentric and correct valgus collapse. Pt continues to demo inc control of movements as well as dec valgus collapse. Pt tolerated exercise progressions well, needs occasional rest breaks due to fatigue. [] No change. [x] Other:   No further progressions today. Focused on controlled movements and mechanics. Attempted lateral monster walks with green tube but was having symptoms with LB so discontinued. Goals  MET NOT MET ON-  GOING  Details   Date Addressed: NA           STG: To be met in 8 treatments            1. ? Pain: Decrease pain levels to 3/10 with ADLs/working out recreationally. []  []  []      2. ? ROM: Increase flexibility in enedelia LEs to improve symmetry and help normalize mechanics with recreational activities and running. []  []  []      3. Pt will self-report better tolerance to all sitting and standing at work to ease work tasks. []  []  []      4. Independent with Home Exercise Programs []  []  []        []  []  []      Date Addressed: NA           LTG: To be met in 16 treatments           1. Improve score on assessment tool LEFS from 35% impairment to less than 20% impairment, demonstrating improved tolerance to working out recreationally. []  []  []      2. Reduce pain levels to 1/10 or less with ADLs/working out recreationally. []  []  []      3. ? Strength:  Increase enedelia hip/core strength to 4+/5 grossly to help improve LE stability with running and working out recreationally. []  []  []      4. Pt will self-report ability to return to all running and working out without any pain to ease activity. Patient goals: \"Be able to run and train w/o pain or issue\"        Pt. Education:  [x] Yes  [] No  [x] Reviewed Prior HEP/Ed  Method of Education: [x] Verbal  [x] Demo  [] Written  Comprehension of Education:  [x] Verbalizes understanding. [x] Demonstrates understanding. [x] Needs review. [] Demonstrates/verbalizes HEP/Ed previously given. Plan: [x] Continue with current plan of care towards goals. Time In: 3:00 pm           Time Out: 3:54 pm    Electronically signed by:  CHEPE Johnson The above documentation completed by Queen Debora, Student Physical Therapist Assistant, was reviewed and accepted by supervising Clinical Instructor Su Smiley PTA.

## 2022-04-21 ENCOUNTER — HOSPITAL ENCOUNTER (OUTPATIENT)
Dept: PHYSICAL THERAPY | Facility: CLINIC | Age: 27
Setting detail: THERAPIES SERIES
Discharge: HOME | End: 2022-04-21
Payer: COMMERCIAL

## 2022-04-21 PROCEDURE — 97110 THERAPEUTIC EXERCISES: CPT
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
## 2022-04-26 ENCOUNTER — HOSPITAL ENCOUNTER (OUTPATIENT)
Dept: PHYSICAL THERAPY | Facility: CLINIC | Age: 27
Setting detail: THERAPIES SERIES
Discharge: HOME | End: 2022-04-26
Payer: COMMERCIAL

## 2022-04-26 PROCEDURE — 97110 THERAPEUTIC EXERCISES: CPT

## 2022-04-26 NOTE — FLOWSHEET NOTE
[x] [address]   Outpatient Rehabilitation &  Therapy  [address]  P: [phone]  F: [phone]     Physical Therapy Daily Treatment Note    Date:  2022  Patient Name:  Concepcion Mcmanus    :  1995  MRN: [id number]  Physician: DIANA Carroll                            Insurance: Update  Medical Diagnosis: M22.2X1- Patellofemoral disorders, right knee                       Rehab Codes: M25.561  Onset date: 2/10/22      Visit# / total visits:      Cancels/No Shows: 1  [employer]: 30 visits/calendar year    Subjective: Pt explains she is having discomfort that is constant around the patella. Also explains that Hypervolt applied last session helped relieve tightness.   Pain:  [x] Yes  [] No  Location: R knee   Pain Rating: (0-10 scale) 1/10  Pain altered Tx:  [x] No  [] Yes  Action:  Comments:        Objective:   Precautions: General  Exercises:  Exercise  R Knee Reps/ Time Weight/ Level Comments    Bike 10m Seat 2 Warm up  x              HS/calf S 30\"x3    x   Prone quad S 30\"x3   Half foam roller under thigh -              SB Bridge with HS curl 20x Blue SB   -          Heel taps 3x10 6\" Front x   Eccentric lowering 30x Bilateral  24\" x   Power strides 30x Bilateral  18\" x   PMT squat/HR 30x   x   SL PMT squat/HR 30x   x   Box squat hovers 30x3\" Purple 12\" with blue foam pad x   SL dead lift touches 3x10 13# KB  x   Heel supported squats 3x10 12# bar  x   BOSU split squat 2x10   x   Split squat TKE 3x10 Red band     Rebounder x30 3.5# Forward only           Other:      Specific Instructions for next treatment: Continue tx per POC        Treatment Charges: Mins Units Time     []  Modalities      [x]  Ther Exercise 57 4 1:58 pm - 2:55 pm   [x]  Manual Therapy 5 nc 2:55 pm - 3:00 pm   []  Ther Activities      []  Aquatics      []  Vasocompression       []  Other      Total Treatment time 62 4 1:58 pm - 3:00 pm       Assessment: [x] Progressing toward goals. Began with spin bike followed by stretching and strengthening as noted above. Pt was able to tolerate both BOSU lunges and PMT squats/HR this tx with no inc in pain/discomfort. However, pt did demo inc fatigue, requiring frequent rest breaks. Verbal cueing provided during hover squats to control concentric and hold, and prevent leaning forward at the hips. Due to inc tightness in R LE, used on roller used on entire R thigh, most tightness noted in the quad and lat thigh. [] No change. [x] Other:   No further progressions today. Focused on controlled movements and mechanics. Goals  MET NOT MET ON-  GOING  Details   Date Addressed: NA           STG: To be met in 8 treatments            1. ? Pain: Decrease pain levels to 3/10 with ADLs/working out recreationally. []  []  []      2. ? ROM: Increase flexibility in enedelia LEs to improve symmetry and help normalize mechanics with recreational activities and running. []  []  []      3. Pt will self-report better tolerance to all sitting and standing at work to ease work tasks. []  []  []      4. Independent with Home Exercise Programs []  []  []        []  []  []      Date Addressed: NA           LTG: To be met in 16 treatments           1. Improve score on assessment tool LEFS from 35% impairment to less than 20% impairment, demonstrating improved tolerance to working out recreationally. []  []  []      2. Reduce pain levels to 1/10 or less with ADLs/working out recreationally. []  []  []      3. ? Strength: Increase enedelia hip/core strength to 4+/5 grossly to help improve LE stability with running and working out recreationally. []  []  []      4. Pt will self-report ability to return to all running and working out without any pain to ease activity. Patient goals: \"Be able to run and train w/o pain or issue\"        Pt.  Education:  [x] Yes  [] No  [x] Reviewed Prior HEP/Ed  Method of Education: [x] Verbal  [x] Demo  [] Written  Comprehension of Education:  [x] Verbalizes understanding. [x] Demonstrates understanding. [x] Needs review. [] Demonstrates/verbalizes HEP/Ed previously given. Plan: [x] Continue with current plan of care towards goals. Time In: 1:58 pm           Time Out: 3:00 pm    Electronically signed by:  CHEPE Encarnacion The above documentation completed by Chaz Conley, Student Physical Therapist Assistant, was reviewed and accepted by supervising Clinical Instructor Cinthia House PTA.

## 2022-05-03 ENCOUNTER — HOSPITAL ENCOUNTER (OUTPATIENT)
Dept: PHYSICAL THERAPY | Facility: CLINIC | Age: 27
Setting detail: THERAPIES SERIES
Discharge: HOME | End: 2022-05-03
Payer: COMMERCIAL

## 2022-05-03 PROCEDURE — 97110 THERAPEUTIC EXERCISES: CPT

## 2022-05-03 NOTE — FLOWSHEET NOTE
[x] [address]    Outpatient Rehabilitation &  Therapy  [address] [place] [address]  P: [phone]  F: [phone]     Physical Therapy Daily Treatment Note    Date:  5/3/2022  Patient Name:  Deep Sadler    :  1995  MRN: [id number]  Physician: DIANA Snow                            Insurance: [employer] ( Visits Approved, [employer])  Medical Diagnosis: M22.2X1- Patellofemoral disorders, right knee                       Rehab Codes: M25.561  Onset date: 2/10/22      Visit# / total visits:      Cancels/No Shows:   [employer]: 30 visits/calendar year    Subjective: Pt explains that her pain levels continue to be low, but she feels that the pain has become more consistent. States that the knee has been feeling fine during and after working longer shifts. 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   Pain:  [x] Yes  [] No  Location: R knee   Pain Rating: (0-10 scale) 1/10  Pain altered Tx:  [x] No  [] Yes  Action:  Comments:        Objective:   Precautions: General  Exercises:  Exercise  R Knee Reps/ Time Weight/ Level Comments    Bike 10m Seat 2 Warm up  x              HS/calf S 30\"x3    x              Heel taps 3x10 6\" Front x   Eccentric lowering 30x Bilateral  24\" x   Power strides 30x Bilateral  18\" x   PMT squat/HR 30x      SL PMT squat/HR 30x      Box squat hovers 30x3\" Purple 12\" with blue foam pad x   SL dead lift touches 3x10 13# KB  x   Heel supported squats 3x10 12# bar  x   BOSU split squat 2x10      Split squat TKE 3x10 Red band     Rebounder x30 3.5# Forward only x          Other:      Specific Instructions for next treatment: Continue tx per POC        Treatment Charges: Mins Units Time     []  Modalities      [x]  Ther Exercise 50 3 2:00 pm - 2:50 pm   [x]  Manual Therapy 5 nc 2:50 pm - 2:55 pm   []  Ther Activities      []  Aquatics      []  Vasocompression       []  Other Total Treatment time 55 3 2:00 pm - 2:55 pm       Assessment: [x] Progressing toward goals. Began with spin bike followed by stretching and strengthening as noted above. Due to c/o consistent pain and pt's qualifiers tonight, held additional progressions this session. Also held PMT squats as well as BOSU lunges as these have caused discomfort in prev txs. No c/o inc sxs with any other activities this session. Completed tx with  to R quad in order to reduce any tension. [] No change. [x] Other:   No further progressions today. Focused on controlled movements and mechanics. Goals  MET NOT MET ON-  GOING  Details   Date Addressed: NA           STG: To be met in 8 treatments            1. ? Pain: Decrease pain levels to 3/10 with ADLs/working out recreationally. []  []  []      2. ? ROM: Increase flexibility in enedelia LEs to improve symmetry and help normalize mechanics with recreational activities and running. []  []  []      3. Pt will self-report better tolerance to all sitting and standing at work to ease work tasks. []  []  []      4. Independent with Home Exercise Programs []  []  []        []  []  []      Date Addressed: NA           LTG: To be met in 16 treatments           1. Improve score on assessment tool LEFS from 35% impairment to less than 20% impairment, demonstrating improved tolerance to working out recreationally. []  []  []      2. Reduce pain levels to 1/10 or less with ADLs/working out recreationally. []  []  []      3. ? Strength: Increase enedelia hip/core strength to 4+/5 grossly to help improve LE stability with running and working out recreationally. []  []  []      4. Pt will self-report ability to return to all running and working out without any pain to ease activity. Patient goals: \"Be able to run and train w/o pain or issue\"        Pt.  Education:  [x] Yes  [] No  [x] Reviewed Prior HEP/Ed  Method of Education: [x] Verbal  [x] Demo  [] Written  Comprehension of Education:  [x] Verbalizes understanding. [x] Demonstrates understanding. [x] Needs review. [] Demonstrates/verbalizes HEP/Ed previously given. Plan: [x] Continue with current plan of care towards goals. Time In: 2:00 pm           Time Out: 2:55 pm    Electronically signed by:  Evangelina Ahumada, SPTA The above documentation completed by Evangelina Ahumada, Student Physical Therapist Assistant, was reviewed and accepted by supervising Clinical Instructor Jeaneen Kussmaul, PTA.

## 2022-05-10 ENCOUNTER — HOSPITAL ENCOUNTER (OUTPATIENT)
Dept: PHYSICAL THERAPY | Facility: CLINIC | Age: 27
Setting detail: THERAPIES SERIES
End: 2022-05-10
Payer: COMMERCIAL

## 2022-05-13 ENCOUNTER — HOSPITAL ENCOUNTER (OUTPATIENT)
Dept: PHYSICAL THERAPY | Facility: CLINIC | Age: 27
Setting detail: THERAPIES SERIES
Discharge: HOME | End: 2022-05-13
Payer: COMMERCIAL

## 2022-05-13 PROCEDURE — 97110 THERAPEUTIC EXERCISES: CPT

## 2022-05-13 PROCEDURE — 97140 MANUAL THERAPY 1/> REGIONS: CPT

## 2022-05-13 NOTE — PROGRESS NOTES
[x] [address]    Outpatient Rehabilitation &  Therapy  [address] [place] [address]  P: [phone]  F: [phone]     Physical Therapy Daily Treatment Note/ Progress Report    Date:  2022  Patient Name:  Shawn Briscoe    :  1995  MRN: [id number]  Physician: DIANA Chris                            Insurance: Michelet Howell ( Visits Approved, [employer])  Medical Diagnosis: M22.2X1- Patellofemoral disorders, right knee                       Rehab Codes: M25.561  Onset date: 2/10/22      Visit# / total visits: 15/16     Cancels/No Shows: 1  [employer]: 30 visits/calendar year    Subjective: Pt reports to PT stating that she feels that her knee has been getting worse ever since starting to  the exercise program in therapy, noting that she has now been having more consistent dull pain, as well as having more episodes of the knee giving out on her.   Pain:  [x] Yes  [] No  Location: R knee   Pain Rating: (0-10 scale) 1-2/10  Pain altered Tx:  [x] No  [] Yes  Action:  Comments:        Objective:   Precautions: General  Exercises:  Exercise  R Knee Reps/ Time Weight/ Level Comments    Bike 10m Seat 2 Warm up                HS/calf S 30\"x3                  Heel taps 3x10 6\" Front    Eccentric lowering 30x Bilateral  24\"    Power strides 30x Bilateral  18\"    PMT squat/HR 30x      SL PMT squat/HR 30x      Box squat hovers 30x3\" Purple 12\" with blue foam pad    SL dead lift touches 3x10 13# KB     Heel supported squats 3x10 12# bar     BOSU split squat 2x10      Split squat TKE 3x10 Red band     Rebounder x30 3.5# Forward only                  Wall sits 3x30\"   x   Hip thrusters 2x10   x   Clamshells 3x10, 2\" Blue Eccentric x   SL Hip abd 3x10, 2\" Blue Eccentric x                 Other: [employer]volt R quad grossly     (+) Patellar Grind test  (-) Thesally  (-) Anterior/Posterior Drawer    - Still with tightness in R quad grossly      Specific Instructions for next treatment: Pt to be placed on hold until she has a f/u with referring physician        Treatment Charges: Mins Units   []  Modalities     [x]  Ther Exercise 35 2   [x]  Manual Therapy 7 1   []  Ther Activities     []  Aquatics     []  Vasocompression      []  Other     Total Treatment time 42 3       Assessment: [x] Progressing toward goals. Since starting therapy, pt reports that she has seen progress in her R knee to date. Per pt, she reports that she has seen improved strength grossly, better tolerance to sitting and standing at work with less pain/discomfort, as well as better overall stability in her R knee. However, pt currently reporting that she is still having pain in her knee, as well as now having more feeling of the leg wanting to give out on her while walking. Pt still presenting with hip weakness more so on R LE compared to the L which could be leading to some of her knee problems that would benefit from additional therapy to keep working on addressing. At this time, pt would like to continue with therapy, so plan to do so for additional 12 visits at 2x/week, however recommended that pt have f/u with referring physician first to determine if any other interventions elisabeth be recommended at this time, with pt in agreement with plan.    [] No change. [x] Other: Completed tx per log above with fair tolerance. Switched up program today to focus more on hip strengthening d/t pt having increased knee pain, while also still demonstrating increased weakness on R LE compared to L. Pt with minimal pain with all exercises completed during treatment, but does report some general aching and discomfort with WB exercises, although notes that it is tolerable to continue with exercises. No increase in pain reported following treatment.      Goals  MET NOT MET ON-  GOING  Details   Date Addressed: 5/13/22           STG: To be met in 8 treatments            1. ? Pain: Decrease pain levels to 3/10 with ADLs/working out recreationally. []  []  [x]      2. ? ROM: Increase flexibility in enedelia LEs to improve symmetry and help normalize mechanics with recreational activities and running. []  []  [x]  Pt still with tightness in R quad    3. Pt will self-report better tolerance to all sitting and standing at work to ease work tasks. [x]  []  []      4. Independent with Home Exercise Programs [x]  []  []        []  []  []      Date Addressed: 5/13/22           LTG: To be met in 16 treatments           1. Improve score on assessment tool LEFS from 35% impairment to less than 20% impairment, demonstrating improved tolerance to working out recreationally. []  []  [x]  Pt scored 22% impaired    2. Reduce pain levels to 1/10 or less with ADLs/working out recreationally. []  []  [x]      3. ? Strength: Increase enedelia hip/core strength to 4+/5 grossly to help improve LE stability with running and working out recreationally. []  []  [x]  3+/5 R hip abd, ext    4. Pt will self-report ability to return to all running and working out without any pain to ease activity. x           Patient goals: \"Be able to run and train w/o pain or issue\"        Pt. Education:  [x] Yes  [] No  [x] Reviewed Prior HEP/Ed  Method of Education: [x] Verbal  [x] Demo  [] Written  Comprehension of Education:  [x] Verbalizes understanding. [x] Demonstrates understanding. [x] Needs review. [] Demonstrates/verbalizes HEP/Ed previously given. Plan: [x] Continue with current plan of care towards goals. X   Pt would benefit from additional 12 visits at 2x/week once current POC is done. Time In: 1100           Time Out: 3242    Electronically signed by:   Megha Rose PT         Physician Signature:________________________________Date:__________________  By signing above or cosigning this note, I have reviewed this plan of care and certify a need for medically necessary rehabilitation services.      *PLEASE SIGN ABOVE AND FAX BACK ALL PAGES*

## 2022-10-18 ENCOUNTER — HOSPITAL ENCOUNTER (OUTPATIENT)
Dept: PHYSICAL THERAPY | Facility: CLINIC | Age: 27
Setting detail: THERAPIES SERIES
Discharge: HOME | End: 2022-10-18
Payer: COMMERCIAL

## 2022-10-18 PROCEDURE — 97110 THERAPEUTIC EXERCISES: CPT

## 2022-10-18 PROCEDURE — 97161 PT EVAL LOW COMPLEX 20 MIN: CPT

## 2022-10-18 PROCEDURE — 97016 VASOPNEUMATIC DEVICE THERAPY: CPT

## 2022-10-18 NOTE — CONSULTS
[] [facility]) [facility] &  Therapy  [address]  [phone]  F: [phone] [] [address]  [facility]   [address]  P: [phone]  F: [phone] [x] [address] &  Therapy  [address]  P: [phone]  F: [phone] [] [address]  P: [phone]  F: [phone] [] [address]  [facility]   [address]   [place]: [phone]  F: [phone]      Physical Therapy Lower Extremity Evaluation    Date:  10/18/2022  Patient: Filomena Barroso  : 1995  MRN: [id number]  Physician: Jamie Augustine MD Insurance: [facility] 30 visits  Medical Diagnosis: Right knee pain  Rehab Codes: M25.561, M25.661, R26.2  Onset date: 10/13/22  Next 's appt.: 10/21/22    Subjective:   CC: Pt c/o R knee pain/stiffness, a lot of discomfort med knee currently notes she was not able to take meds this morning. Feeling better overall w/ ice and meds. HPI: (onset date) Pt report she had a few minor injuries to R knee, had prev PT and injection w/o much relief resulting in surg for plica resection /35.     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,   Akila ramirez deputy   Job Status []  Normal duty   [] Light duty   [x] Off due to condition    []  Retired   [] Not employed   [] Disability  [] Other:  [x]  Return to work: Work activities/duties Running, strength training, jiu-jitsu       ADL/IADL Previous level of function Current level of function Who currently assists the patient with task   Bathing  [x] Independent  [] Assist [x] Independent  [] Assist    Dress/grooming [x] Independent  [] Assist [x] Independent  [] Assist    Transfer/mobility [x] Independent  [] Assist [x] Independent  [] Assist    Feeding [x] Independent  [] Assist [x] Independent  [] Assist    Toileting [x] Independent  [] Assist [x] Independent  [] Assist    Driving [x] Independent  [] Assist [] Independent  [x] Assist fiance   Housekeeping [x] Independent  [] Assist [x] Independent  [] Assist    Grocery shop/meal prep [x] Independent  [] Assist [x] Independent  [] Assist      Gait Prior level of function Current level of function    [x] Independent  [] Assist [x] Independent  [] Assist   Device: [x] Independent [x] Independent     Pain:  [x] Yes  [] No Location: R medial knee Pain Rating: (0-10 scale) 2/10  Pain altered Tx:  [] Yes  [x] No  Action:    Symptoms:  [x] Improving [] Worsening [] Same  Sleep: [x] OK    [] Disturbed    Objective:    ROM  ° A/P STRENGTH TESTS (+/-) Left Right Not Tested    Left Right Left Right Ant.  Drawer   []   Hip Flex    4+ Post. Drawer   []   Ext     Lachmans   []   ER     Valgus Stress   []   IR     Varus Stress   []   ABD    4+ Letys   []   ADD     Apleys Comp.   []   Knee Flex 140 78  5 Apleys Dist.   []   Ext 0 0  4+ Hip Scouring   []   Ankle DF    5 ROSA ELENAs   []   PF     Piriformis   []   INV     Armidas   []   EVER     Talor Tilt   []        Pat-Fem Grind   []   R +quad lag    OBSERVATION No Deficit Deficit Not Tested Comments   Posture       Palpation [] [x] [] 4 Steri strips intact, min dried blood, no redness or warmth   Sensation [] [] []    Edema [] [x] [] Min R knee   Neurological [] [] []    Patellar Mobility [] [] []    Patellar Orientation [] [] []    Gait [] [x] [] Analysis: no AD, antalgic, decr stance R LE         FUNCTION Normal Difficult Unable   Sitting [] [] []   Standing [] [x] []   Ambulation [] [x] []   Groom/Dress [] [] []   Lift/Carry [] [] []   Stairs [] [x] []   Bending [] [x] []   Squat [] [x] []   Kneel [] [x] []       Functional Test: LEFS Score: 53% functionally impaired     Assessment:  Patient would benefit from skilled physical therapy services in order to: address R knee ROM, strength and functional deficits to allow for return to normal activity and job duties    Problems:    [x] ? Pain: R knee  [x] ? ROM: R knee  [x] ? Strength: R hip, knee, core stability  [x] ? Function: difficulty w/ squatting, stairs, running and recreational activity  [] Other:       STG: (to be met in 12 treatments)  ? Pain: Pt to report no pain w/ ADL's  ? ROM: R knee AROM 0-130  ? Strength: R LE grossly 5/5, demo good core stability  ? Function:Improve LEFS from 53% impairment to less than 20% impairment, demonstrating improved tolerance to working out, recreational activity and job dutues  Patient to be independent with home exercise program as demonstrated by performance with correct form without cues. Patient goals: heal and run, exercise again    Rehab Potential:  [x] Good  [] Fair  [] Poor   Suggested Professional Referral:  [x] No  [] Yes:  Barriers to Goal Achievement:  [x] No  [] Yes:  Domestic Concerns:  [x] No  [] Yes:    Pt. Education:  [x] Plans/Goals, Risks/Benefits discussed  [x] Home exercise program    Method of Education: [x] Verbal  [x] Demo  [x] Written-HEP  Comprehension of Education:  [x] Verbalizes understanding. [] Demonstrates understanding. [] Needs Review. [] Demonstrates/verbalizes understanding of HEP/Ed previously given.     Treatment Plan:  [x] Therapeutic Exercise   34734  [] Iontophoresis: 4 mg/mL Dexamethasone Sodium Phosphate  mAmin  74372   [x] Therapeutic Activity  87568 [x] Vasopneumatic cold with compression  85976    [x] Gait Training   82781 [] Ultrasound   [id number]   [x] Neuromuscular Re-education  26615 [] Electrical Stimulation Unattended  30927   [x] Manual Therapy  28025 [] Electrical Stimulation Attended  88268   [x] Instruction in HEP  [] Lumbar/Cervical Traction  [id number]   [] Aquatic Therapy   65899 [x] Cold/hotpack    [] Massage   34515      [] Dry Needling, 1 or 2 muscles  40038   [] Biofeedback, first 15 minutes   22191  [] Biofeedback, additional 15 minutes   49785 [] Dry Needling, 3 or more muscles  79936     Frequency:  2-3 x/week for 12 visits    Todays Treatment:  Modalities: vaso med pressure R knee post Rx  Precautions:  Exercises:  Exercise Reps/ Time Weight/ Level Comments   bike      HS, quad, calf stretch            QS, HS 10x2 10\"    Heel slide 10x2     SAQ 10x2     SLR 10x2 AAROM 1st set    LAQ 10x2     Other: Instructed in therex per log and issued HO for HEP.  Encouraged ice prn    Specific Instructions for next treatment: progress ROM and strength as leonor      Evaluation Complexity:  History (Personal factors, comorbidities) [] 0 [x] 1-2 [] 3+   Exam (limitations, restrictions) [x] 1-2 [] 3 [] 4+   Clinical presentation (progression) [x] Stable [] Evolving  [] Unstable   Decision Making [x] Low [] Moderate [] High    [x] Low Complexity [] Moderate Complexity [] High Complexity       Treatment Charges: Mins Units   [] Evaluation       []  Low       []  Moderate       []  High 15 1   []  Modalities     [x]  Ther Exercise 20 1   []  Manual Therapy     []  Ther Activities     []  Aquatics     [x]  Vasocompression 15 1   []  Other       TOTAL TREATMENT TIME: 50    Time in:11:05 am   Time Out:12:00 pm    Electronically signed by: Abraham Jefferson PT        Physician Signature:________________________________Date:__________________  By signing above or cosigning this note, I have reviewed this plan of care and certify a need for medically necessary rehabilitation services.      *PLEASE SIGN ABOVE AND FAX BACK ALL PAGES*

## 2022-10-21 ENCOUNTER — HOSPITAL ENCOUNTER (OUTPATIENT)
Dept: PHYSICAL THERAPY | Facility: CLINIC | Age: 27
Setting detail: THERAPIES SERIES
Discharge: HOME | End: 2022-10-21
Payer: COMMERCIAL

## 2022-10-21 PROCEDURE — 97016 VASOPNEUMATIC DEVICE THERAPY: CPT

## 2022-10-21 PROCEDURE — 97110 THERAPEUTIC EXERCISES: CPT

## 2022-10-21 NOTE — FLOWSHEET NOTE
[x] [address]    Outpatient Rehabilitation &  Therapy  [address] [place] [address]  P: [phone]  F: [phone]      Physical Therapy Daily Treatment Note    Date:  10/21/2022  Patient: Jessica Gamboa              : 1995                      MRN: [id number]  Physician: Bianka Manzano MD        Insurance: [facility] 30 visits  Medical Diagnosis: Right knee pain             Rehab Codes: M25.561, M25.661, R26.2  Onset date: 10/13/22             Next Dr's appt.: 10/21/22  Visit# / total visits: 2     Cancels/No Shows:     Subjective:  pt arrived reporting minimal pain in R knee. Pt ambulating with antalgic gait pattern but improvement since eval. Notes she had follow up and was advised to wear compression sleeve during day for reduced swelling. Pain:  [x] Yes  [] No Location: R knee Pain Rating: (0-10 scale) 2/10  Pain altered Tx:  [] No  [] Yes  Action:  Comments:    Todays Treatment:  Modalities: vaso med pressure R knee post Rx  Precautions:  Exercises:  Exercise Reps/ Time Weight/ Level Comments   scifit 10'   Bike next time   Supine calf stretch 3x30\"        Supine HS stretch 3x30\"     Prone quad stretch 3x30\"              QS, HS 15x5\"      Heel slide 10x2       SAQ 10x2       SLR 10x2      LAQ 10x2             TKE 20x5\" lime Blue next time    TG squats 2x10 L12 Increase resistance next time   Other: Instructed in therex per log and issued HO for HEP. Encouraged ice prn     Specific Instructions for next treatment: progress ROM and strength as leonor      Treatment Charges: Mins Units   []  Modalities     [x]  Ther Exercise 35 2   []  Manual Therapy     []  Ther Activities     []  Aquatics     [x]  Vasocompression 15 1   []  Other     Total Treatment time 50 3       Assessment: [x] Progressing toward goals. Initiated treatment with scifit followed by stretches and exercises with good tolerance.  Pt having slight increase in pain when going into extension but after performing stretch or exercise pain decreased. Notes has constant discomfort due to swelling. Vocal cues provided for good quad activation with SLR with pt displaying slight quad lag. Issued blue band for TKE with good understanding noted. Ended with vaso for decreased soreness and swelling. [] No change. [] Other:      STG: (to be met in 12 treatments)  ? Pain: Pt to report no pain w/ ADL's  ? ROM: R knee AROM 0-130  ? Strength: R LE grossly 5/5, demo good core stability  ? Function:Improve LEFS from 53% impairment to less than 20% impairment, demonstrating improved tolerance to working out, recreational activity and job dutues  Patient to be independent with home exercise program as demonstrated by performance with correct form without cues. Patient goals: heal and run, exercise again    Pt. Education:  [x] Yes  [] No  [x] Reviewed Prior HEP/Ed  Method of Education: [x] Verbal  [x] Demo  [] Written  Comprehension of Education:  [x] Verbalizes understanding. [x] Demonstrates understanding. [] Needs review. [] Demonstrates/verbalizes HEP/Ed previously given. Plan: [x] Continue with current plan of care towards goals.         Time In:1:00 pm            Time Out: 2:05 pm    Electronically signed by:  Carlos Orona PTA

## 2022-10-24 ENCOUNTER — HOSPITAL ENCOUNTER (OUTPATIENT)
Dept: PHYSICAL THERAPY | Facility: CLINIC | Age: 27
Setting detail: THERAPIES SERIES
Discharge: HOME | End: 2022-10-24
Payer: COMMERCIAL

## 2022-10-24 PROCEDURE — 97110 THERAPEUTIC EXERCISES: CPT

## 2022-10-24 PROCEDURE — 97016 VASOPNEUMATIC DEVICE THERAPY: CPT

## 2022-10-24 NOTE — FLOWSHEET NOTE
[x] [address]    Outpatient Rehabilitation &  Therapy  [address] [place] [address]  P: [phone]  F: [phone]     Physical Therapy Daily Treatment Note    Date:  10/24/2022  Patient: Rylee Grijalva              : 1995                      MRN: [id number]  Physician: Claudia Leroy MD        Insurance: [facility] 30 visits  Medical Diagnosis: Right knee pain             Rehab Codes: M25.561, M25.661, R26.2  Onset date: 10/13/22             Next Dr's appt.: 10/21/22  Visit# / total visits: 3/12     Cancels/No Shows:     Subjective: R knee feeling better, less pain and soreness. Just some morning stiffness in the knee. No issues following her previous visit. Pain:  [x] Yes  [] No Location: R knee Pain Rating: (0-10 scale) 2/10  Pain altered Tx:  [] No  [] Yes  Action:  Comments:    Todays Treatment:  Modalities: vaso med pressure R knee post Rx  Precautions:  Exercises:  Exercise Reps/ Time Weight/ Level Comments   Spin bike 8'      wedge calf stretch 3x30\"        Stool HS stretch 3x30\"     Prone quad stretch 3x30\"              QS 15x5\"      Heel slide 20x5\"       bridges 10x2  lime     SLR 10x2      LAQ 10x2       clams 10x2  lime    Hip abduction 10x2  lime                BOSU lunges 15x   RLE   3 way hip 15x lime OKC   TKE 20x5\" blue     TG squats/HR 20x L16    Other:      Specific Instructions for next treatment: progress ROM and strength as leonor      Treatment Charges: Mins Units   []  Modalities     [x]  Ther Exercise 40 3   []  Manual Therapy     []  Ther Activities     []  Aquatics     [x]  Vasocompression 15 1   []  Other     Total Treatment time 55 4       Assessment: [x] Progressing toward goals. Warm up progressed to bike to improve ROM and function. Stretches completed in WB. Added resisted side lying and bridges to mat program, good tolerance. Gym ex completed with good tolerance.  PT did complain of slight increased pain with full extension but diminished with rest. Improved ROM noted grossly. Ended with vaso to decrease continued swelling and decreased post ex soreness. [] No change. [] Other:      STG: (to be met in 12 treatments)  ? Pain: Pt to report no pain w/ ADL's  ? ROM: R knee AROM 0-130  ? Strength: R LE grossly 5/5, demo good core stability  ? Function:Improve LEFS from 53% impairment to less than 20% impairment, demonstrating improved tolerance to working out, recreational activity and job dutues  Patient to be independent with home exercise program as demonstrated by performance with correct form without cues. Patient goals: heal and run, exercise again    Pt. Education:  [x] Yes  [] No  [] Reviewed Prior HEP/Ed  Method of Education: [x] Verbal  [x] Demo  [] Written  Comprehension of Education:  [x] Verbalizes understanding. [x] Demonstrates understanding. [] Needs review. [] Demonstrates/verbalizes HEP/Ed previously given. Plan: [x] Continue with current plan of care towards goals.         Time In: 8:02 am            Time Out: 9:07 am    Electronically signed by:  Black Cabezas PTA

## 2022-10-26 ENCOUNTER — HOSPITAL ENCOUNTER (OUTPATIENT)
Dept: PHYSICAL THERAPY | Facility: CLINIC | Age: 27
Setting detail: THERAPIES SERIES
Discharge: HOME | End: 2022-10-26
Payer: COMMERCIAL

## 2022-10-26 PROCEDURE — 97016 VASOPNEUMATIC DEVICE THERAPY: CPT

## 2022-10-26 PROCEDURE — 97110 THERAPEUTIC EXERCISES: CPT

## 2022-10-26 NOTE — FLOWSHEET NOTE
[x] [address]    Outpatient Rehabilitation &  Therapy  [address] [place] [address]  P: [phone]  F: [phone]     Physical Therapy Daily Treatment Note    Date:  10/26/2022  Patient: Filomena Barroso              : 1995                      MRN: [id number]  Physician: Jamie Augustine MD        Insurance: [facility] 30 visits  Medical Diagnosis: Right knee pain             Rehab Codes: M25.561, M25.661, R26.2  Onset date: 10/13/22             Next Dr's appt.: 10/21/22  Visit# / total visits:      Cancels/No Shows:     Subjective: Pt mentions that her R knee is feeling better but the L knee has been more bothersome lately. Pain:  [x] Yes  [] No Location: R knee Pain Rating: (0-10 scale) 2/10  Pain altered Tx:  [] No  [] Yes  Action:  Comments:    Todays Treatment:  Modalities: vaso med pressure R knee post Rx  Precautions:  Exercises:  Exercise Reps/ Time Weight/ Level Comments   Spin bike 8'      wedge calf stretch 3x30\"        Stool HS stretch 3x30\"     Prone quad stretch 3x30\"                     bridges 10x2  lime     SLR 10x2      clams 10x2  lime    Hip abduction 10x2  lime                BOSU lunges 15x   RLE   3 way hip 2x10 lime CKC   TKE 20x5\" blue     TG squats/HR 20x L16    Lateral heel taps 2x10 4\"          Other:      Specific Instructions for next treatment: progress ROM and strength as leonor      Treatment Charges: Mins Units   []  Modalities     [x]  Ther Exercise 40 3   []  Manual Therapy     []  Ther Activities     []  Aquatics     [x]  Vasocompression 15 1   []  Other     Total Treatment time 55 4       Assessment: [x] Progressing toward goals. bike and stretches as previous. Continued with resisted mat ex. No pain just fatigued. Good tolerance to gym ex. Was able to add lateral heel taps and 3 way hip completed in CKC to improve single leg stability and strength. Mild soreness following heel taps but improved with rest. No deficits with R knee ROM at this time. Vaso post ex for post ex symptoms control. [] No change. [] Other:      STG: (to be met in 12 treatments)  ? Pain: Pt to report no pain w/ ADL's  ? ROM: R knee AROM 0-130  ? Strength: R LE grossly 5/5, demo good core stability  ? Function:Improve LEFS from 53% impairment to less than 20% impairment, demonstrating improved tolerance to working out, recreational activity and job dutues  Patient to be independent with home exercise program as demonstrated by performance with correct form without cues. Patient goals: heal and run, exercise again    Pt. Education:  [x] Yes  [] No  [] Reviewed Prior HEP/Ed  Method of Education: [x] Verbal  [] Demo  [] Written  Comprehension of Education:  [x] Verbalizes understanding. [x] Demonstrates understanding. [] Needs review. [] Demonstrates/verbalizes HEP/Ed previously given. Plan: [x] Continue with current plan of care towards goals.         Time In: 8:02 am            Time Out: 9:08 am    Electronically signed by:  Vinod Hernandez PTA

## 2022-10-28 ENCOUNTER — HOSPITAL ENCOUNTER (OUTPATIENT)
Dept: PHYSICAL THERAPY | Facility: CLINIC | Age: 27
Setting detail: THERAPIES SERIES
Discharge: HOME | End: 2022-10-28
Payer: COMMERCIAL

## 2022-10-28 PROCEDURE — 97110 THERAPEUTIC EXERCISES: CPT

## 2022-10-28 PROCEDURE — 97016 VASOPNEUMATIC DEVICE THERAPY: CPT

## 2022-10-28 NOTE — FLOWSHEET NOTE
[x] [address]    Outpatient Rehabilitation &  Therapy  [address] [facility]  P: [phone]  F: [phone]     Physical Therapy Daily Treatment Note    Date:  10/28/2022  Patient: Ella Crawley              : 1995                      MRN: [id number]  Physician: Josh Manuel MD        Insurance: [facility] 30 visits  Medical Diagnosis: Right knee pain             Rehab Codes: M25.561, M25.661, R26.2  Onset date: 10/13/22             Next Dr's appt.: 10/21/22  Visit# / total visits:      Cancels/No Shows:     Subjective:  no pain currently. States she was able to do some yard work yesterday that did not bother the r knee at all during or after. Pain:  [] Yes  [x] No Location: R knee Pain Rating: (0-10 scale) -/10  Pain altered Tx:  [] No  [] Yes  Action:  Comments:    Todays Treatment:  Modalities: vaso med pressure R knee post Rx  Precautions:  Exercises:  Exercise Reps/ Time Weight/ Level Comments   Spin bike 8'      wedge calf stretch 3x30\"        Stool HS stretch 3x30\"     Prone quad stretch 3x30\"                     SB bridges 10x2       SLR 10x2      clams 10x2  blue    Hip abduction 10x2  blue                BOSU lunges 15x  Fwd/lat RLE   3 way hip 2x10 lime CKC   TKE 20x5\" blue     TG squats/HR 20x L18    TG RLE squat 20x L13    Lateral heel taps 2x10 4\"    monsterwalks 2L blue    Other:      Specific Instructions for next treatment: progress ROM and strength as leonor      Treatment Charges: Mins Units   []  Modalities     [x]  Ther Exercise 40 3   []  Manual Therapy     []  Ther Activities     []  Aquatics     [x]  Vasocompression 15 1   []  Other     Total Treatment time 55 4       Assessment: [x] Progressing toward goals. Warm up completed followed by stretches. Increased resistance with mat ex and progressed bridges to PB bridges to involve more core activation with ex. Able to add resisted side steps and add single leg squats on TGym.  All progressions completed with good tolerance. Pt currently wearing a brace on the L knee that allowed pt to complete charted ex without reports of L knee pain. Good recall of technique. Vaso applied to end treatment to control post ex soreness. [] No change. [] Other:      STG: (to be met in 12 treatments)  ? Pain: Pt to report no pain w/ ADL's  ? ROM: R knee AROM 0-130  ? Strength: R LE grossly 5/5, demo good core stability  ? Function:Improve LEFS from 53% impairment to less than 20% impairment, demonstrating improved tolerance to working out, recreational activity and job dutues  Patient to be independent with home exercise program as demonstrated by performance with correct form without cues. Patient goals: heal and run, exercise again    Pt. Education:  [x] Yes  [] No  [] Reviewed Prior HEP/Ed  Method of Education: [x] Verbal  [] Demo  [] Written  Comprehension of Education:  [x] Verbalizes understanding. [x] Demonstrates understanding. [] Needs review. [] Demonstrates/verbalizes HEP/Ed previously given. Plan: [x] Continue with current plan of care towards goals.         Time In: 9:00 am            Time Out: 10:07 am    Electronically signed by:  Vinod Hernandez PTA

## 2022-10-31 ENCOUNTER — HOSPITAL ENCOUNTER (OUTPATIENT)
Dept: PHYSICAL THERAPY | Facility: CLINIC | Age: 27
Setting detail: THERAPIES SERIES
Discharge: HOME | End: 2022-10-31
Payer: COMMERCIAL

## 2022-10-31 PROCEDURE — 97110 THERAPEUTIC EXERCISES: CPT

## 2022-10-31 PROCEDURE — 97016 VASOPNEUMATIC DEVICE THERAPY: CPT

## 2022-10-31 NOTE — FLOWSHEET NOTE
[x] [address]   Outpatient Rehabilitation &  Therapy  [address]  P: [phone]  F: [phone]     Physical Therapy Daily Treatment Note    Date:  10/31/2022  Patient: Geno Nowak              : 1995                      MRN: [id number]  Physician: Wali Godinez MD        Insurance: [facility] 30 visits  Medical Diagnosis: Right knee pain             Rehab Codes: M25.561, M25.661, R26.2  Onset date: 10/13/22             Next Dr's appt.: 10/21/22  Visit# / total visits:      Cancels/No Shows:     Subjective:  no pain in R knee, but states L knee is starting to bother her, and feels like its the same thing as the R. States only discomfort is at max knee flexion for R knee. Has follow up in November to address L knee pain. Pain:  [] Yes  [x] No Location: R knee Pain Rating: (0-10 scale) -/10  Pain altered Tx:  [] No  [] Yes  Action:  Comments:    Todays Treatment:  Modalities: vaso med pressure R knee post Rx  Precautions:  Exercises:  Exercise Reps/ Time Weight/ Level Comments   Spin bike 8'      wedge calf stretch 3x30\"        Stool HS stretch 3x30\"     Prone quad stretch 3x30\"                     SB bridges 10x3       SLR 10x3      clams 10x3  blue    Hip abduction 10x3  blue                BOSU lunges 2x10 Fwd/lat RLE   3 way hip 2x10 blue CKC   TKE 20x5\" blue     TG squats/HR 20x L20    TG RLE squat 20x L14    Lateral heel taps 2x10 4\"    monsterwalks 2L plum    Other:      Specific Instructions for next treatment: progress ROM and strength as leonor      Treatment Charges: Mins Units   []  Modalities     [x]  Ther Exercise 40 3   []  Manual Therapy     []  Ther Activities     []  Aquatics     [x]  Vasocompression 15 1   []  Other     Total Treatment time 55 4       Assessment: [x] Progressing toward goals.  Pt able to progress with increased resistance for 3 way hip and monsterwalks, increased level for TG, and increased reps for all mat work with good tolerance. Pt having slight limp post standing exercises this date, noting some discomfort. Ended with vaso for decreased soreness. [] No change. [] Other:      STG: (to be met in 12 treatments)  ? Pain: Pt to report no pain w/ ADL's  ? ROM: R knee AROM 0-130  ? Strength: R LE grossly 5/5, demo good core stability  ? Function:Improve LEFS from 53% impairment to less than 20% impairment, demonstrating improved tolerance to working out, recreational activity and job dutues  Patient to be independent with home exercise program as demonstrated by performance with correct form without cues. Patient goals: heal and run, exercise again    Pt. Education:  [x] Yes  [] No  [] Reviewed Prior HEP/Ed  Method of Education: [x] Verbal  [] Demo  [] Written  Comprehension of Education:  [x] Verbalizes understanding. [x] Demonstrates understanding. [] Needs review. [] Demonstrates/verbalizes HEP/Ed previously given. Plan: [x] Continue with current plan of care towards goals.         Time In: 9:30 am            Time Out: 10:33 am    Electronically signed by:  Vivi Lomax PTA

## 2022-11-02 ENCOUNTER — HOSPITAL ENCOUNTER (OUTPATIENT)
Dept: PHYSICAL THERAPY | Facility: CLINIC | Age: 27
Setting detail: THERAPIES SERIES
Discharge: HOME | End: 2022-11-02
Payer: COMMERCIAL

## 2022-11-02 PROCEDURE — 97016 VASOPNEUMATIC DEVICE THERAPY: CPT

## 2022-11-02 PROCEDURE — 97110 THERAPEUTIC EXERCISES: CPT

## 2022-11-02 NOTE — FLOWSHEET NOTE
[x] [address]    Outpatient Rehabilitation &  Therapy  [address] [place] [address]  P: [phone]  F: [phone]     Physical Therapy Daily Treatment Note    Date:  2022  Patient: Shahram Joe              : 1995                      MRN: [id number]  Physician: Haim Peraza MD        Insurance: [facility] to [facility] 30 visits  Medical Diagnosis: Right knee pain             Rehab Codes: M25.561, M25.661, R26.2  Onset date: 10/13/22             Next Dr's appt.: 22  Visit# / total visits:      Cancels/No Shows:     Subjective:  Pt arrived reporting mostly \"discomfort\" of the R knee, minimal pain upon the L. R LE edema is worse today cause she forgot to wear the sleeve for half of the day yesterday. Ices and elevates the leg frequently with no sig change with edema    Pain:  [x] Yes  [] No Location: R knee Pain Rating: (0-10 scale) 1/10 (R) 2/10 (L)  Pain altered Tx:  [] No  [] Yes  Action:  Comments:    Todays Treatment:  Modalities: vaso med pressure R knee post Rx  Precautions:  Exercises:  Exercise Reps/ Time Weight/ Level Comments   Spin bike 8'      wedge calf stretch 3x30\"        Stool HS stretch 3x30\"     Prone quad stretch 3x30\"                     SB bridges 10x3       SLR 10x3      clams 10x3  blue    Hip abduction 10x3  blue                BOSU lunges 2x10 Fwd/lat RLE   3 way hip 2x10 blue CKC   TKE 20x5\" blue     TG squats/HR 30x L20    TG RLE squat 30x L15    Lateral heel taps 2x10 4\"    monsterwalks 2L plum    Powerstrides 2x10 8\"    Other:      Specific Instructions for next treatment: progress ROM and strength as leonor      Treatment Charges: Mins Units   []  Modalities     [x]  Ther Exercise 40 3   []  Manual Therapy     []  Ther Activities     []  Aquatics     [x]  Vasocompression 15 1   []  Other     Total Treatment time 55 4       Assessment: [x] Progressing toward goals. Increased reps for TG squats to further advance LE strengthening with no discomfort.  Added

## 2022-11-04 ENCOUNTER — HOSPITAL ENCOUNTER (OUTPATIENT)
Dept: PHYSICAL THERAPY | Facility: CLINIC | Age: 27
Setting detail: THERAPIES SERIES
Discharge: HOME | End: 2022-11-04
Payer: COMMERCIAL

## 2022-11-04 PROCEDURE — 97016 VASOPNEUMATIC DEVICE THERAPY: CPT

## 2022-11-04 PROCEDURE — 97110 THERAPEUTIC EXERCISES: CPT

## 2022-11-04 NOTE — FLOWSHEET NOTE
[x] [address]    Outpatient Rehabilitation &  Therapy  [address] [place] [address]  P: [phone]  F: [phone]     Physical Therapy Daily Treatment Note    Date:  2022  Patient: Rylee Grijalva               : 1995                      MRN: [id number]  Physician: Claudia Leroy MD         Insurance: [facility] 30 visits  Medical Diagnosis: Right knee pain            Rehab Codes: M25.561, M25.661, R26.2  Onset date: 10/13/22               Next Dr's appt.: 22  Visit# / total visits:      Cancels/No Shows:     Subjective:  Pt mentions that her R knee is feeling better this morning, was having increased discomfort yesterday, L knee is feeling better as well. Pain:  [x] Yes  [] No Location: R knee Pain Rating: (0-10 scale) 1/10 (R) 2/10 (L)  Pain altered Tx:  [] No  [] Yes  Action:  Comments:    Todays Treatment:  Modalities: vaso med pressure R knee post Rx  Precautions:  Exercises:  Exercise Reps/ Time Weight/ Level Comments   Spin bike 8'    Seat 7    wedge calf stretch 3x30\"        Stool HS stretch 3x30\"                  SB bridges 10x3       clams 10x3  blue    Hip abduction 10x3  blue                BOSU lunges 2x10 Fwd/lat RLE   3 way hip 2x10 plum CKC   TKE 20x5\" plum     Rebounder  2x20\" 1.5    TG RLE squat 30x L15    Lateral heel taps 2x10 4\"    monsterwalks 2L plum    Powerstrides 2x10 8\"    Other:      Specific Instructions for next treatment: progress ROM and strength as leonor      Treatment Charges: Mins Units   []  Modalities     [x]  Ther Exercise 40 3   []  Manual Therapy     []  Ther Activities     []  Aquatics     [x]  Vasocompression 15 1   []  Other     Total Treatment time 55 4       Assessment: [x] Progressing toward goals. Warm up completed followed by stretches with good tolerance. BOSU lunges were painful but decreased with rest. All other strengthening and stability ex completed without pain symptoms. Demonstrates good recall of previous ex.  Able to rebounder for additional stability. Hip fatigue present following resisted ex. Just mild soreness post ex but diminished with vaso post ex. [] No change. [] Other:      STG: (to be met in 12 treatments)  ? Pain: Pt to report no pain w/ ADL's  ? ROM: R knee AROM 0-130  ? Strength: R LE grossly 5/5, demo good core stability  ? Function:Improve LEFS from 53% impairment to less than 20% impairment, demonstrating improved tolerance to working out, recreational activity and job dutues  Patient to be independent with home exercise program as demonstrated by performance with correct form without cues. Patient goals: heal and run, exercise again    Pt. Education:  [x] Yes  [] No  [] Reviewed Prior HEP/Ed  Method of Education: [x] Verbal  [] Demo  [] Written  Comprehension of Education:  [x] Verbalizes understanding. [x] Demonstrates understanding. [] Needs review. [] Demonstrates/verbalizes HEP/Ed previously given. Plan: [x] Continue with current plan of care towards goals.         Time In: 9:00 AM          Time Out: 10:05 AM    Electronically signed by:  Tayler Can PTA

## 2022-11-07 ENCOUNTER — HOSPITAL ENCOUNTER (OUTPATIENT)
Dept: PHYSICAL THERAPY | Facility: CLINIC | Age: 27
Setting detail: THERAPIES SERIES
Discharge: HOME | End: 2022-11-07
Payer: COMMERCIAL

## 2022-11-07 PROCEDURE — 97110 THERAPEUTIC EXERCISES: CPT

## 2022-11-07 PROCEDURE — 97016 VASOPNEUMATIC DEVICE THERAPY: CPT

## 2022-11-07 NOTE — FLOWSHEET NOTE
[x] [address]   Outpatient Rehabilitation &  Therapy  [address] [facility]  P: [phone]  F: [phone]     Physical Therapy Daily Treatment Note    Date:  2022  Patient: Leticia Vargas               : 1995                      MRN: [id number]  Physician: Jesica De Souza MD         Insurance: [facility] 30 visits  Medical Diagnosis: Right knee pain            Rehab Codes: M25.561, M25.661, R26.2  Onset date: 10/13/22               Next Dr's appt.: 22  Visit# / total visits:      Cancels/No Shows:     Subjective:  Pt arrived reporting some continued swelling in R knee. L knee is feeling better. Pt was provided return to work last week. Pain:  [x] Yes  [] No Location: R knee Pain Rating: (0-10 scale) 1/10 (R) 2/10 (L)  Pain altered Tx:  [] No  [] Yes  Action:  Comments:    Todays Treatment:  Modalities: vaso med pressure R knee post Rx  Precautions:  Exercises:  Exercise Reps/ Time Weight/ Level Comments   Spin bike 8'    Seat 7    wedge calf stretch 3x30\"        Stool HS stretch 3x30\"                  SB bridges 10x3       clams 10x3 plum    Hip abduction 10x3 plum                BOSU lunges 2x10 Fwd/lat RLE   3 way hip 2x10 plum CKC   TKE 20x5\" plum     Rebounder  2x20 1.5    TG RLE squat 30x L16    Lateral heel taps 2x10 6\"    monsterwalks 2L plum    Powerstrides 2x10 12\"          Alter G walk/run 3'/2' 80% 2 cycles    Other:      Specific Instructions for next treatment: increase level on AlterG if no increase in pain. Treatment Charges: Mins Units   []  Modalities     [x]  Ther Exercise 40 3   []  Manual Therapy     []  Ther Activities     []  Aquatics     [x]  Vasocompression 15 1   []  Other     Total Treatment time 55 4       Assessment: [x] Progressing toward goals: able to progress pt with increasing mat resistance as well a increase height for powerstrides and heel taps with good tolerance.  Added in AlterG walk/run this date due to pt possibly returning to work this weekend. Pt having no increase in pain just noting some pressure in knee during run. Ended with vaso for decreased swelling and soreness. Continue to work on LE strength as well as increase level for AlterG as long as pt does not have increase in symptoms. [] No change. [] Other:      STG: (to be met in 12 treatments)  ? Pain: Pt to report no pain w/ ADL's  ? ROM: R knee AROM 0-130  ? Strength: R LE grossly 5/5, demo good core stability  ? Function:Improve LEFS from 53% impairment to less than 20% impairment, demonstrating improved tolerance to working out, recreational activity and job dutues  Patient to be independent with home exercise program as demonstrated by performance with correct form without cues. Patient goals: heal and run, exercise again    Pt. Education:  [x] Yes  [] No  [] Reviewed Prior HEP/Ed  Method of Education: [x] Verbal  [] Demo  [] Written  Comprehension of Education:  [x] Verbalizes understanding. [x] Demonstrates understanding. [] Needs review. [] Demonstrates/verbalizes HEP/Ed previously given. Plan: [x] Continue with current plan of care towards goals.         Time In: 9:30 AM          Time Out: 10:35 AM    Electronically signed by:  Jinny Jackson PTA

## 2022-11-09 ENCOUNTER — HOSPITAL ENCOUNTER (OUTPATIENT)
Dept: PHYSICAL THERAPY | Facility: CLINIC | Age: 27
Setting detail: THERAPIES SERIES
Discharge: HOME | End: 2022-11-09
Payer: COMMERCIAL

## 2022-11-09 PROCEDURE — 97110 THERAPEUTIC EXERCISES: CPT

## 2022-11-09 PROCEDURE — 97016 VASOPNEUMATIC DEVICE THERAPY: CPT

## 2022-11-09 NOTE — FLOWSHEET NOTE
[x] [address]    Outpatient Rehabilitation &  Therapy  [address] [place] [address]  P: [phone]  F: [phone]     Physical Therapy Daily Treatment Note    Date:  2022  Patient: Ella Crawley               : 1995                      MRN: [id number]  Physician: Josh Manuel MD         Insurance: [facility] 30 visits  Medical Diagnosis: Right knee pain            Rehab Codes: M25.561, M25.661, R26.2  Onset date: 10/13/22               Next Dr's appt.: 22  Visit# / total visits: 10/12     Cancels/No Shows:     Subjective:  Pt arrived reporting she is a little sore from her run the other day. Continued edema of the R LE however the L LE pain is starting to subside. Set to return to work on the . Pain:  [x] Yes  [] No Location: R knee Pain Rating: (0-10 scale) \"sore\"/10 (R) 1/10 (L)  Pain altered Tx:  [] No  [] Yes  Action:  Comments:    Todays Treatment:  Modalities: vaso med pressure R knee post Rx  Precautions:  Exercises:  Exercise Reps/ Time Weight/ Level Comments   Spin bike 8'    Seat 7    wedge calf stretch 3x30\"        Stool HS stretch 3x30\"                  SB bridges 10x3       clams 10x3 plum    Hip abduction 10x3 plum                BOSU lunges 2x10 Fwd/lat RLE   3 way hip 2x10 plum CKC   TKE 20x5\" plum     Rebounder  2x20 1.5    TG RLE squat 30x L16    Lateral heel taps 2x10 6\"    monsterwalks 2L plum    Powerstrides 2x10 12\"          Alter G walk/run 3'/2' 80-85% 3 cycles    Other:      Specific Instructions for next treatment: increase level on AlterG if no increase in pain. Treatment Charges: Mins Units   []  Modalities     [x]  Ther Exercise 40 3   []  Manual Therapy     []  Ther Activities     []  Aquatics     [x]  Vasocompression 15 1   []  Other     Total Treatment time 55 4       Assessment: [x] Progressing toward goals: Continued with bike warmup followed by therex as detailed with no complaints.  Able to increase BW % on 2nd cycle as pt denies any pain. Pt expressed feeling instability of the R LE and difficulty with advancement. Also stated that walking is more irritable than running in the Alter G. Self corrections made to equalize weight bearing distribution and step length. Ended session with vaso for soreness reduction. Plan to advance strengthening as sx allow. [] No change. [] Other:      STG: (to be met in 12 treatments)  ? Pain: Pt to report no pain w/ ADL's  ? ROM: R knee AROM 0-130  ? Strength: R LE grossly 5/5, demo good core stability  ? Function:Improve LEFS from 53% impairment to less than 20% impairment, demonstrating improved tolerance to working out, recreational activity and job dutues  Patient to be independent with home exercise program as demonstrated by performance with correct form without cues. Patient goals: heal and run, exercise again    Pt. Education:  [x] Yes  [] No  [] Reviewed Prior HEP/Ed  Method of Education: [x] Verbal  [] Demo  [] Written  Comprehension of Education:  [x] Verbalizes understanding. [x] Demonstrates understanding. [] Needs review. [] Demonstrates/verbalizes HEP/Ed previously given. Plan: [x] Continue with current plan of care towards goals. Time In: 9:00 AM          Time Out: 10:05 AM    Electronically signed by:   Marie Murillo

## 2022-11-11 ENCOUNTER — HOSPITAL ENCOUNTER (OUTPATIENT)
Dept: PHYSICAL THERAPY | Facility: CLINIC | Age: 27
Setting detail: THERAPIES SERIES
Discharge: HOME | End: 2022-11-11
Payer: COMMERCIAL

## 2022-11-11 PROCEDURE — 97016 VASOPNEUMATIC DEVICE THERAPY: CPT

## 2022-11-11 PROCEDURE — 97110 THERAPEUTIC EXERCISES: CPT

## 2022-11-22 ENCOUNTER — HOSPITAL ENCOUNTER (OUTPATIENT)
Dept: PHYSICAL THERAPY | Facility: CLINIC | Age: 27
Setting detail: THERAPIES SERIES
Discharge: HOME | End: 2022-11-22
Payer: COMMERCIAL

## 2022-11-22 PROCEDURE — 97530 THERAPEUTIC ACTIVITIES: CPT

## 2022-11-22 PROCEDURE — 97016 VASOPNEUMATIC DEVICE THERAPY: CPT

## 2022-11-22 PROCEDURE — 97110 THERAPEUTIC EXERCISES: CPT

## 2022-11-22 NOTE — FLOWSHEET NOTE
[x] [address]    Outpatient Rehabilitation &  Therapy  [address] [place] [address]  P: [phone]  F: [phone]     Physical Therapy Daily Treatment Note    Date:  2022  Patient: Isacc Mari               : 1995                      MRN: [id number]  Physician: Neymar Chavez MD         Insurance: [facility] 30 visits  Medical Diagnosis: Right knee pain            Rehab Codes: M25.561, M25.661, R26.2  Onset date: 10/13/22               Next Dr's appt.: 22  Visit# / total visits:      Cancels/No Shows:     Subjective:  Pt reporting she has sharp pain w/ squatting fast, hasn't ran yet aside from the alter G. Has [employer] full duty. Sees ortho tomorrow. Notes random pain/soreness, occ swelling w/ sitting/standing. L knee is feeling better. Pain:  [x] Yes  [] No Location: R knee Pain Rating: (0-10 scale) \"sore\" . 5/10 (R) 1/10 (L)  Pain altered Tx:  [] No  [] Yes  Action:  Comments:    Todays Treatment:  Modalities: vaso med pressure R knee post Rx  Precautions:  Exercises:  Exercise Reps/ Time Weight/ Level Comments   Spin bike 8'    Seat 7    wedge calf stretch 3x30\"        Stool HS stretch 3x30\"                  SB bridges 10x3       clams 10x3 plum    Hip abduction 10x3 plum                BOSU lunges 3x10 Fwd/lat RLE   3 way hip 2x10 plum CKC   TKE 20x5\" plum     Rebounder  3x10 1.5 3 way   TG RLE squat 30x L16    Lateral heel taps 3x10 6\"    monsterwalks 2L Blue tube Around feet    Powerstrides 3x10 12\"          Alter G walk/run 3'/2' 85-90% 3 cycles    TM 6m 4.8          Other:      Specific Instructions for next treatment: run assessment w/ run specialist      Treatment Charges: Mins Units   []  Modalities     [x]  Ther Exercise 30 2   []  Manual Therapy     [x]  Ther Activities 10 1   []  Aquatics     [x]  Vasocompression 15 1   []  Other     Total Treatment time 55 4       Assessment: [x] Progressing toward goals:  Completed therex per log w/ progressions noted. Progressed to TM w/ full [address] this date w/ good leonor and no incr in pain. Pt would like to cont w/ PT for further ex progression and work on run mechanics for job duties, notes she enjoys running for leisure as well. [] No change. [x] Other: R knee ROM: 0-140. L knee ROM: 0-140, strength B LE's grossly 5/5. Scheduled w/ run specialist next visit to work on proper run mechanics and injury prevention. STG: (to be met in 12 treatments)  ? Pain: Pt to report no pain w/ ADL's-progressing towards  ? ROM: R knee AROM 0-130- MET  ? Strength: R LE grossly 5/5, demo good core stability- MET  ? Function:Improve LEFS from 53% impairment to less than 20% impairment, demonstrating improved tolerance to working out, recreational activity and job duties-  MET  Patient to be independent with home exercise program as demonstrated by performance with correct form without cues. -progressing towards                    Patient goals: heal and run, exercise again    Pt. Education:  [x] Yes  [] No  [x] Reviewed Prior HEP/Ed  Method of Education: [x] Verbal  [] Demo  [] Written  Comprehension of Education:  [x] Verbalizes understanding. [x] Demonstrates understanding. [] Needs review. [] Demonstrates/verbalizes HEP/Ed previously given. Plan: [x] Continue with current plan of care towards goals.         Time In: 2:59 PM          Time Out:  3:58 PM    Electronically signed by:  Bren Venegas, PT

## 2022-12-02 NOTE — PROGRESS NOTES
[] [facility]) [facility] &  Therapy  [address]  [phone]  F: [phone] [] [address]  [email or web address]   [address]  P: [phone]  F: [phone] [] [address] &  Therapy  [address]  P: [phone]  F: [phone] [] [address]  P: [phone]  F: [phone] [] [address]  [facility]   [address]   [place]: [phone]  F: [phone]      Physical Therapy Progress Note    Date: 2022      Patient: Ruby Hardy  : 1995  MRN: [id number]    Physician: Phillip Kimble MD                 Insurance: [facility] 30 visits  Medical Diagnosis: Right knee pain            Rehab Codes: M25.561, M25.661, R26.2  Onset date: 10/13/22                           Next 's appt.: 22  Visit# / total visits:                                 Cancels/No Shows: 0    Subjective:  Pt reporting she has sharp pain w/ squatting fast, hasn't ran yet aside from the alter G. Has RTW full duty. Sees ortho tomorrow. Notes random pain/soreness, occ swelling w/ sitting/standing. L knee is feeling better. Pain:  [x] Yes  [] No  Location: R knee         Pain Rating: (0-10 scale) \"sore\" . 5/10 (R) 1/10 (L)  Pain altered Tx:  [] No  [] Yes  Action:  Comments:     Objective:  Test Measurements:R knee ROM: 0-140. L knee ROM: 0-140, strength B LE's grossly 5/5. Function: LEFS 74/80, 8% deficit    Assessment:  STG: (to be met in 12 treatments)  ? Pain: Pt to report no pain w/ ADL's-progressing towards  ? ROM: R knee AROM 0-130- MET  ? Strength: R LE grossly 5/5, demo good core stability- MET  ?  Function:Improve LEFS from 53% impairment to less than 20% impairment, demonstrating improved tolerance to working out, recreational activity and job duties-  MET  Patient to be independent with home exercise program as demonstrated by performance with correct form without cues. -progressing towards         Treatment Plan:  [x] Therapeutic Exercise   68608  [] Iontophoresis: 4 mg/mL Dexamethasone Sodium Phosphate  mAmin  10812   [x] Therapeutic Activity  40287 [x] Vasopneumatic cold with compression  96667    [] Gait Training   64245 [] Ultrasound   74310   [] Neuromuscular Re-education  99753 [] Electrical Stimulation Unattended  32494   [] Manual Therapy  39647 [] Electrical Stimulation Attended  30580   [x] Instruction in HEP  [] Lumbar/Cervical Traction  63367   [] Aquatic Therapy   43379 [x] Cold/hotpack    [] Massage   22351      [] Dry Needling, 1 or 2 muscles  46545   [] Biofeedback, first 15 minutes   03582  [] Biofeedback, additional 15 minutes   12636 [] Dry Needling, 3 or more muscles  02794       Patient Status:     [x] Continue per initial plan of care. [x] Additional visits necessary. [x] Other: Pt would like to cont w/ PT for further ex progression and work on run mechanics and injury prevention w/ run specialist     Requested Frequency/Duration: 1 times bi-weekly for 3 treatments. Electronically signed by Josette Martinez PT on 12/1/2022 at 10:13 PM      If you have any questions or concerns, please don't hesitate to call. Thank you for your referral.    Physician Signature:________________________________Date:__________________  By signing above or cosigning this note, I have reviewed this plan of care and certify a need for medically necessary rehabilitation services.      *PLEASE SIGN ABOVE AND FAX BACK ALL PAGES*

## 2022-12-06 ENCOUNTER — HOSPITAL ENCOUNTER (OUTPATIENT)
Dept: PHYSICAL THERAPY | Facility: CLINIC | Age: 27
Setting detail: THERAPIES SERIES
Discharge: HOME | End: 2022-12-06
Payer: COMMERCIAL

## 2022-12-06 PROCEDURE — 97110 THERAPEUTIC EXERCISES: CPT

## 2022-12-06 PROCEDURE — 97112 NEUROMUSCULAR REEDUCATION: CPT

## 2022-12-06 NOTE — FLOWSHEET NOTE
[x] [address]   Outpatient Rehabilitation &  Therapy  [address]  P: [phone]  F: [phone]     Physical Therapy Daily Treatment Note    Date:  2022  Patient: Leticia aVrgas               : 1995                      MRN: [id number]  Physician: Jesica De Souza MD         Insurance: [facility] 30 visits  Medical Diagnosis: Right knee pain            Rehab Codes: M25.561, M25.661, R26.2  Onset date: 10/13/22               Next Dr's appt.: 22  Visit# / total visits: 13/15     Cancels/No Shows:     Subjective:  Pt reporting she has some stiffness and soreness in the AM. Just saw Dr. Supriya Hudson and he thought it was probably just scar tissue and fluid. Pain:  [x] Yes  [] No Location: R knee Pain Rating: (0-10 scale) \"sore\" . 5/10 (R) 1/10 (L)  Pain altered Tx:  [] No  [] Yes  Action:  Comments:    Todays Treatment:  Reeval         ROM  ° A/P STRENGTH     Left Right Left Right   Hip Flex WNL WNL       Ext WNL WNL 5 5-   ER WNL WNL 5 5   IR WNL WNL       ABD WNL WNL 4+ 4   Knee Flex WNL WNL 5 5   Ext WNL WNL       Ankle DF WNL 15 knee flexed       PF WNL WNL       INV WNL WNL       EVER WNL WNL       GTE WNL WNL        SL HR R 12 before she is unable to make is through full ROM L 20  SL squat: B quad dominant with dynamic genu valgus R>L  NMR   Warm up:2min walk   Pace:  12:00 min/mile   Duration: 15minutes    Shoes: vivar adreneline   Emmanuelle: 172 spm increased to 174sp,    Dynamic genu valgus    Contralateral pelvic drop    Increased hip adduction          Sagittal plane deviations:     Increased near knee extension at initial contact    Elevated foot ground angle at initial contact    Limited hip ext from midstance to toe off    Decreased knee flexion at midstance    Decreased knee flexion in swing       Other:      Recommendations:     Increase emmanuelle to 174spm    Drive from knee      Modalities: prn  Precautions:none  Exercises: completed this date in bold  Exercise Reps/ Time Weight/ Level Comments   Spin bike 8'    Seat 7    wedge calf stretch 3x30\"        Stool HS stretch 3x30\"                  SB bridges 10x3       clams 10x3 plum    1/2 side plank w/Hip abduction 4x5     Leg press 2x10 45#    Eccentric calf raises x20     Retro lunge to hip drive 4W06  B   3 way hip 2x10 plum CKC   TKE 20x5\" plum     Rebounder  3x10 1.5 3 way   TG RLE squat 30x L16    Lateral heel taps 3x10 6\" Foam roller in front of knee   monsterwalks 2L Blue  Knees               Alter G walk/run 3'/2' 85-90% 3 cycles    TM 6m 4.8          Other:      Specific Instructions for next treatment:Recheck strength and run mechanics, progress strengthening split squat isotonic an low step Tajik, increase step height      Treatment Charges: Mins Units   []  Modalities     [x]  Ther Exercise 40 3   []  Manual Therapy     []  Ther Activities     []  Aquatics     []  Vasocompression     [x]  NMR 15 1   Total Treatment time 55 4   10 min Reeval not billed     Assessment: [x] Progressing toward goals:  Pt needs to be able to run for work as well as for her own physical and mental well being. She was seen today for the purpose of looking a run mechanics so that she can begin to run pain free post knee surgery. Pt was reevaluated for ROM and strength required for running. Her R soleus is limiting her R ankle DF. B glute medius remains weak and pt is very quad dominant. This combination of ROM and strength deficit causes mechanical faults listed above under NMR. Made some adjustments  to sola as well as driving from the knee with some good results in the sagittal plane but still need functional strength and ROM gains to correct the frontal plane deviations. Goals below were adjusted to be in line with current demands of her job. [] No change. [] Other:       STG: (to be met in 15 treatments)  ? Pain: Pt to report no pain w/ ADL's-progressing towards  ? ROM: R knee AROM 0-130- MET  ? Strength: R LE 20 reps of calf raise through full ROM and able to perform 10 reps of single leg squat without dynamic genu valgusto aide in normal LE mechanics during running and 0/10 knee pain  ? Function:UWRI <20% limitation indicating pt is back to running as needed for work and recreation  Patient to be independent with home exercise program as demonstrated by performance with correct form without cues. -progressing towards                    Patient goals: heal and run, exercise again    Pt. Education:  [x] Yes  [] No  [x] Reviewed Prior HEP/Ed  Method of Education: [x] Verbal  [x] Demo  [x] Written  Access Code: Madisyn Duran: [email or web address]  Date: 12/06/2022  Prepared by: Conrad Bliss    Exercises  Standing Eccentric Heel Raise - 1 x daily - 7 x weekly - 3 sets - 10 reps  Standing Soleus Stretch - 1 x daily - 7 x weekly - 2 sets - 1min hold  Reverse Lunge - 1 x daily - 7 x weekly - 3 sets - 10 reps  Modified Side Plank with Hip Abduction - 1 x daily - 7 x weekly - 3 sets - 10 reps  Side Stepping with Resistance at Thighs - 1 x daily - 7 x weekly - 3 sets - 10 reps  Lateral Step Down - 1 x daily - 7 x weekly - 3 sets - 10 reps    Comprehension of Education:  [x] Verbalizes understanding. [] Demonstrates understanding. [x] Needs review. [] Demonstrates/verbalizes HEP/Ed previously given. Plan: [x] Continue with current plan of care towards goals.         Time In: 1300        Time Out:  1405    Electronically signed by:  Micheal Todd PT

## 2022-12-20 ENCOUNTER — HOSPITAL ENCOUNTER (OUTPATIENT)
Dept: PHYSICAL THERAPY | Facility: CLINIC | Age: 27
Setting detail: THERAPIES SERIES
Discharge: HOME | End: 2022-12-20
Payer: COMMERCIAL

## 2022-12-20 PROCEDURE — 97110 THERAPEUTIC EXERCISES: CPT

## 2022-12-20 PROCEDURE — 97112 NEUROMUSCULAR REEDUCATION: CPT

## 2022-12-20 NOTE — FLOWSHEET NOTE
[x] [address]    Outpatient Rehabilitation &  Therapy  [address] [place] [address]  P: [phone]  F: [phone]     Physical Therapy Daily Treatment Note    Date:  2022  Patient: Geno Nowak               : 1995                      MRN: [id number]  Physician: Wali Godinez MD         Insurance: [facility] 30 visits  Medical Diagnosis: Right knee pain            Rehab Codes: M25.561, M25.661, R26.2  Onset date: 10/13/22               Next 's appt.: 22  Visit# / total visits: 14/15     Cancels/No Shows:     Subjective:  Pt reporting she did well with HEP and did the 3'run/2'walk x6 every other day or every 3rd day depending on work schedule. No increase in symptoms at the knees. Pt reports her legs are fried after leg press doing double leg and then  a set of eccentrics     Pain:  [x] Yes  [] No Location: R knee Pain Rating: (0-10 scale) \"sore\" . 5/10 (R) 1/10 (L)  Pain altered Tx:  [] No  [] Yes  Action:  Comments:    Todays Treatment:    NMR   Warm up:2min walk   Pace:  10:54min/mile   Duration: 17minutes 4'run/1'walk x3   Shoes: vivar adreneline   Sola: 176spm   Cue: Metronome to cue sola and cue to back off knee lift          Modalities: prn  Precautions:none  Exercises: completed this date in bold  Exercise Reps/ Time Weight/ Level Comments   Spin bike 8'    Seat 7    Burrito calf stretch 2'       Stool HS stretch 3x30\"                  SB bridges 10x3       clams 10x3 plum    1/2 side plank w/Hip abduction 2x10     Deadbug  2x20 Red SB    Leg press 2x10 45#    Eccentric calf raises x20     Retro lunge to hip drive last set to hop 3x10  B   Donkey kick x10  Leaned over high table   Hip extension x10   Leaned over high table   Rebounder  3x10 1.5 3 way   TG RLE squat 30x L16    Lateral heel taps 3x10 6\" Foam roller in front of knee   monsterwalks 2L Blue  Knees   Split squat isotonic 2x10     Other:      Specific Instructions for next treatment:Recheck strength and run mechanics, progress strengthening low step [place] Verdean, increase step height      Treatment Charges: Mins Units   []  Modalities     [x]  Ther Exercise 40 3   [x]  Manual Therapy 5 NC   []  Ther Activities     []  Aquatics     []  Vasocompression     [x]  NMR 15 1   Total Treatment time 60 4       Assessment: [x] Progressing toward goals:  Improved strength and stability in core and hip abductor. Ankle DF was still limited. Better technique on exercises overall. Pt is still lacking a lot of quad strength. Progressed her program with split squats and pt had a difficult time making it to 10 reps before quad was visibly shaking and she was fatigued. Hep updated. Will give pt time to progress with regard to strength and gave the ok for 4'run/1'walk x6 every other day for 1 week then progress to 20 min run every other day x1 week. [] No change. [] Other:       STG: (to be met in 15 treatments)  ? Pain: Pt to report no pain w/ ADL's-progressing towards  ? ROM: R knee AROM 0-130- MET  ? Strength: R LE 20 reps of calf raise through full ROM and able to perform 10 reps of single leg squat without dynamic genu valgusto aide in normal LE mechanics during running and 0/10 knee pain  ? Function:UWRI <20% limitation indicating pt is back to running as needed for work and recreation  Patient to be independent with home exercise program as demonstrated by performance with correct form without cues. -progressing towards                    Patient goals: heal and run, exercise again    Pt. Education:  [x] Yes  [] No  [x] Reviewed Prior HEP/Ed  Method of Education: [x] Verbal  [x] Demo  [x] Written  Access Code: Cat Diaz: [email or web address]  Date: 12/20/2022  Prepared by: Fernando Giang    Exercises  Standing Eccentric Heel Raise - 1 x daily - 7 x weekly - 3 sets - 10 reps  Standing Soleus Stretch - 1 x daily - 7 x weekly - 2 sets - 1min hold  Reverse Lunge - 1 x daily - 7 x weekly - 3 sets - 10 reps  Modified Side Plank with Hip Abduction - 1 x daily - 7 x weekly - 3 sets - 10 reps  Side Stepping with Resistance at Thighs - 1 x daily - 7 x weekly - 3 sets - 10 reps  Lateral Step Down - 1 x daily - 7 x weekly - 3 sets - 10 reps  Dead Bug with Swiss Ball - 1 x daily - 7 x weekly - 2 sets - 20 reps  Split Squats Upright Trunk with Dumbbells (Quad Bias) - 1 x daily - 7 x weekly - 2-3 sets - 10 reps      Comprehension of Education:  [x] Verbalizes understanding. [] Demonstrates understanding. [x] Needs review. [] Demonstrates/verbalizes HEP/Ed previously given. Plan: [x] Continue with current plan of care towards goals.         Time In: 5352        Time Out:  1876    Electronically signed by:  Obie Robledo PT

## 2023-01-05 ENCOUNTER — HOSPITAL ENCOUNTER (OUTPATIENT)
Dept: PHYSICAL THERAPY | Facility: CLINIC | Age: 28
Setting detail: THERAPIES SERIES
Discharge: HOME | End: 2023-01-05
Payer: COMMERCIAL

## 2023-01-05 PROCEDURE — 97110 THERAPEUTIC EXERCISES: CPT

## 2023-01-05 PROCEDURE — 97112 NEUROMUSCULAR REEDUCATION: CPT

## 2023-01-05 NOTE — FLOWSHEET NOTE
[x] [address]    Outpatient Rehabilitation &  Therapy  [address] [place] [address]  P: [phone]  F: [phone]     Physical Therapy Daily Treatment Note    Date:  2023  Patient: Bari Franks               : 1995                      MRN: [id number]  Physician: Puma Garcia MD         Insurance: [facility] 30 visits  Medical Diagnosis: Right knee pain            Rehab Codes: M25.561, M25.661, R26.2  Onset date: 10/13/22               Next Dr's appt.: 22  Visit# / total visits: 15/15     Cancels/No Shows:     Subjective:  Pt reporting more soreness after last run. Did the 4'run/1'walk x6 cycles x 6 or 7 times. All the other run/walks were good but more increased pain after the last one up to 3/10 with R knee. It calmed right back down the next day after the run     Pain:  [x] Yes  [] No Location: R knee Pain Rating: (0-10 scale) \"sore\" . 510 (R)   Pain altered Tx:  [] No  [] Yes  Action:  Comments:    Todays Treatment:    NMR      Pace:  10:54min/mile 1 bout of 30sec at 9:13   Duration: 17minutes 4'run/1'walk x3   Shoes: vivar adreneline   Sola: 176spm   Cue: Metronome intermittently to cue sola and cue to relax shoulders          Manual: FRSL T4-8 MOB  Precautions:none  Exercises: completed this date in bold  Exercise Reps/ Time Weight/ Level Comments   Foam roll upper back x15     Spin bike 8'    Seat 7    Burrito calf stretch 2'       Stool HS stretch 3x30\"                  SB bridges 10x3       clams 10x3 plum    1/2 side plank w/Hip abduction 2x10     Deadbug  2x20 Red SB    Toe yoga x20     Leg press 2x10 45#    Eccentric calf raises x20     Retro lunge to hip drive last set to hop 3x10  B   Donkey kick x10  Leaned over high table   Hip extension x10   Leaned over high table   Rebounder  3x10 1.5 3 way   TG RLE squat 30x L16    Lateral heel taps 3x10 8\" Foam roller in front of knee   monsterwalks 1L Blue  Feet   Split squat isotonic 2x10 10# B    [facility] Split squat  x10     Other:      Specific Instructions for next treatment:Recheck strength and run mechanics      Treatment Charges: Mins Units   []  Modalities     [x]  Ther Exercise 35 2   [x]  Manual Therapy 2 NC   []  Ther Activities     []  Aquatics     []  Vasocompression     [x]  NMR 17 1   Total Treatment time 59 3       Assessment: [x] Progressing toward goals:  Improved run mechanics with only cue to relax shoulders as pt was demonstrating retro lean. She is testing 5/5 with B hip ext, abd, ER however functional strength and proprioception is still lacking with dynamic genu valgus with single leg squat. Some of this is driven by lack of intrinsic foot strength. Still requires cue for correction of mechanics on some exercises. [] Other:       STG: (to be met in 15 treatments)  ? Pain: Pt to report no pain w/ ADL's-progressing towards  ? ROM: R knee AROM 0-130- MET  ? Strength: R LE 20 reps of calf raise through full ROM and able to perform 10 reps of single leg squat without dynamic genu valgusto aide in normal LE mechanics during running and 0/10 knee pain Improved but NOT  MET  ? Function:UWRI <20% limitation indicating pt is back to running as needed for work and recreation    Patient to be independent with home exercise program as demonstrated by performance with correct form without cues. -progressing towards but still needs some correction                    Patient goals: heal and run, exercise again    Pt. Education:  [x] Yes  [] No  [x] Reviewed Prior HEP/Ed  Method of Education: [x] Verbal  [x] Demo  [x] Written  Access Code: Annabelle Seay: [email or web address]  Date: 12/20/2022  Prepared by: Bobbi Toro    Exercises  Standing Eccentric Heel Raise - 1 x daily - 7 x weekly - 3 sets - 10 reps  Standing Soleus Stretch - 1 x daily - 7 x weekly - 2 sets - 1min hold  Reverse Lunge - 1 x daily - 7 x weekly - 3 sets - 10 reps  Modified Side Plank with Hip Abduction - 1 x daily - 7 x weekly - 3 sets - 10 reps  Side Stepping with Resistance at Thighs - 1 x daily - 7 x weekly - 3 sets - 10 reps  Lateral Step Down - 1 x daily - 7 x weekly - 3 sets - 10 reps  Dead Bug with Nory Frisk - 1 x daily - 7 x weekly - 2 sets - 20 reps  Split Squats Upright Trunk with Dumbbells (Quad Bias) - 1 x daily - 7 x weekly - 2-3 sets - 10 reps      Comprehension of Education:  [x] Verbalizes understanding. [] Demonstrates understanding. [x] Needs review. [] Demonstrates/verbalizes HEP/Ed previously given. Plan: [x] Continue with current plan of care. Pt to continue her HEP and add foam rolling to upper back and relax shoulders with running. Continue to improve aerobic fitness with 4'run1' walk then progress to 20 min run every other day or every 3rd day depending on work schedule.          Time In: 1130        Time Out:  1230    Electronically signed by:  Chata Barnett, PT

## 2023-01-05 NOTE — PROGRESS NOTES
[x] [address] [facility]   Outpatient Rehabilitation &  Therapy  [address] [place] [address]  P: [phone]  F: [phone]     Physical Therapy Progress Note    Date: 2023      Patient: Rosamaria Roger  : 1995  MRN: [id number]   Physician: Rodolfo Rdz MD                 Insurance: [facility] 30 visits  Medical Diagnosis: Right knee pain            Rehab Codes: M25.561, M25.661, R26.2  Onset date: 10/13/22                           Next Dr's appt.: 22  Visit# / total visits: 15/15                                Cancels/No Shows: 0      Subjective:  Pt reporting more soreness after last run. Did the 4'run/1'walk x6 cycles x 6 or 7 times. All the other run/walks were good but more increased pain after the last one up to 3/10 with R knee. It calmed right back down the next day after the run     Pain:  [x] Yes  [] No  Location: R knee         Pain Rating: (0-10 scale) \"sore\" . 5/10 (R)   Pain altered Tx:  [] No  [] Yes  Action:    Objective:  Hip ext 5/5  Hip abd 5/5  Hip ER 5/5    Single leg squat dynamic genu valgus B   Single leg balance L collapse of arch R weight bearing on  lateral border of foot    Assessment:  Improved run mechanics with only cue to relax shoulders as pt was demonstrating retro lean. She is testing 5/5 with B hip ext, abd, ER however functional strength and proprioception is still lacking with dynamic genu valgus with single leg squat. Some of this is driven by lack of intrinsic foot strength. Still requires cue for correction of mechanics on some exercises. The dynamic genu valgus is also seen with running on R but decreased from last visit 2 weeks ago. STG: (to be met in 15 treatments)  ? Pain: Pt to report no pain w/ ADL's-progressing towards  ? ROM: R knee AROM 0-130- MET  ?  Strength: R LE 20 reps of calf raise through full ROM and able to perform 10 reps of single leg squat without dynamic genu valgusto aide in normal LE mechanics during running and 0/10 knee pain Improved but NOT  MET  ? Function:UWRI <20% limitation indicating pt is back to running as needed for work and recreation    Patient to be independent with home exercise program as demonstrated by performance with correct form without cues. -progressing towards but still needs some correction                      Treatment Plan:  [x] Therapeutic Exercise   88820  [] Iontophoresis: 4 mg/mL Dexamethasone Sodium Phosphate  mAmin  81932   [] Therapeutic Activity  27734 [] Vasopneumatic cold with compression  44049    [] Gait Training   54935 [] Ultrasound   62890   [x] Neuromuscular Re-education  98616 [] Electrical Stimulation Unattended  06204   [x] Manual Therapy  92642 [] Electrical Stimulation Attended  83960   [x] Instruction in HEP  [] Lumbar/Cervical Traction  12452   [] Aquatic Therapy   74486 [] Cold/hotpack    [] Massage   57900      [] Dry Needling, 1 or 2 muscles  63534   [] Biofeedback, first 15 minutes   59247  [] Biofeedback, additional 15 minutes   87179 [] Dry Needling, 3 or more muscles  20078       Patient Status:     [] Continue per initial plan of care. [x] Additional visits necessary to continue to progress functional strength and proprioception to allow pt to run and sprint for her job. [] Other:     Requested Frequency/Duration: 1 time every 2-4 weeks for 4 treatments. Electronically signed by Caitlin Gracia PT on 1/5/2023 at 1:58 PM      If you have any questions or concerns, please don't hesitate to call. Thank you for your referral.    Physician Signature:________________________________Date:__________________  By signing above or cosigning this note, I have reviewed this plan of care and certify a need for medically necessary rehabilitation services.      *PLEASE SIGN ABOVE AND FAX BACK ALL PAGES*

## 2023-01-31 ENCOUNTER — APPOINTMENT (OUTPATIENT)
Dept: PHYSICAL THERAPY | Facility: CLINIC | Age: 28
End: 2023-01-31
Payer: COMMERCIAL

## 2023-02-02 ENCOUNTER — HOSPITAL ENCOUNTER (OUTPATIENT)
Dept: PHYSICAL THERAPY | Facility: CLINIC | Age: 28
Setting detail: THERAPIES SERIES
Discharge: HOME | End: 2023-02-02
Payer: COMMERCIAL

## 2023-02-02 PROCEDURE — 97110 THERAPEUTIC EXERCISES: CPT

## 2023-02-02 NOTE — FLOWSHEET NOTE
[x] [address]   Outpatient Rehabilitation &  Therapy  [address]  P: [phone]  F: [phone]     Physical Therapy Daily Treatment Note/ Discharge Note    Date:  2023  Patient: Brianna Gorman               : 1995                      MRN: [id number]  Physician: Cari Briggs MD         Insurance: [facility] 30 visits  Medical Diagnosis: Right knee pain            Rehab Codes: M25.561, M25.661, R26.2  Onset date: 10/13/22               Next Dr's appt.: 22  Visit# / total visits:      Cancels/No Shows:     Subjective:  Pt reporting the only issue is a little groin pain R >L but knees are feeling good.  Able to do 20 minutes straight of running without pain in knees      Pain:  [x] Yes  [] No Location: R knee Pain Rating: (0-10 scale) 0/10 (R)   Pain altered Tx:  [] No  [] Yes  Action:      Todays Treatment:    NMR      Pace:  10:00min/mile    Duration: 4 min   Shoes: vivar adreneline   Emmanuelle: self selected and higher than target of 176spm   Cue: none needed        Manual: MET to correct R anterior innominate rotation  Precautions:none  Exercises: completed this date in bold  Exercise Reps/ Time Weight/ Level Comments   Foam roll upper back x15     Spin bike 8'    Seat 7    Burrito calf stretch 2'       Stool HS stretch 3x30\"                  SB bridges 10x3       clams 10x3 plum    1/2 side plank w/Hip abduction 2x10     Deadbug  2x20 Red SB    Toe yoga x20     Leg press 2x10 45#    Eccentric calf raises x20     Retro lunge to hip drive last set to hop 3x10  B   Donkey kick x10  Leaned over high table   Hip extension x10   Leaned over high table   Rebounder  3x10 1.5 3 way   TG RLE squat 30x L16    Lateral heel taps 2x10 8\" Foam roller in front of knee   monsterwalks 2L [place]   Retro lunge to hop  1 set     [place] Split squat  x10     Other:   Functional strength  Lateral step 8\" with no genu valgus  Single leg heel raise 20 reps B through full range without deviation        Treatment Charges: Mins Units   []  Modalities     [x]  Ther Exercise 30 2   [x]  Manual Therapy 2 NC   []  Ther Activities     []  Aquatics     []  Vasocompression     [x]  NMR 4 NC   Total Treatment time 36 2       Assessment: Pt has made great gains since last visit on functional strength and is able to control genu valgus and contralateral hip drop. She is able to maintain a sola that allows her to land close to her COM and her posture is normal with running. I did encourage her to continue with core strengthening to ensure she can hold as she starts to add in interval training to get speed up. STG: (to be met in 15 treatments)  ? Pain: Pt to report no pain w/ ADL's-Met with no knee pain  ? ROM: R knee AROM 0-130- MET  ? Strength: R LE 20 reps of calf raise through full ROM and able to perform 10 reps of single leg squat without dynamic genu valgusto aide in normal LE mechanics during running and 0/10 knee pain  MET  ? Function:UWRI <20% limitation indicating pt is back to running as needed for work and recreation  Met at 8% limitation  Patient to be independent with home exercise program as demonstrated by performance with correct form without cues. -Met                    Patient goals: heal and run, exercise again    Pt. Education:  [x] Yes  [] No  [x] Reviewed Prior HEP/Ed  Method of Education: [x] Verbal  [x] Demo  [x] Written  Access Code: Lamar Keller: [email or web address]  Date: 12/20/2022  Prepared by: Tomy Tamayo    Exercises  Standing Eccentric Heel Raise - 1 x daily - 7 x weekly - 3 sets - 10 reps  Standing Soleus Stretch - 1 x daily - 7 x weekly - 2 sets - 1min hold  Reverse Lunge - 1 x daily - 7 x weekly - 3 sets - 10 reps  Modified Side Plank with Hip Abduction - 1 x daily - 7 x weekly - 3 sets - 10 reps  Side Stepping with Resistance at Thighs - 1 x daily - 7 x weekly - 3 sets - 10 reps  Lateral Step Down - 1 x daily - 7 x weekly - 3 sets - 10 reps  Dead Bug with Swiss Ball - 1 x daily - 7 x weekly - 2 sets - 20 reps  Split Squats Upright Trunk with Dumbbells (Quad Bias) - 1 x daily - 7 x weekly - 2-3 sets - 10 reps      Comprehension of Education:  [x] Verbalizes understanding. [] Demonstrates understanding. [x] Needs review. [] Demonstrates/verbalizes HEP/Ed previously given. Plan: Pt is discharged to [facility] at this time with stability shoe recommendation given (loliony guide) and progression on running.       Time In: 1030        Time Out:  1106    Electronically signed by:  Mian Chacon PT